# Patient Record
Sex: MALE | Race: WHITE | NOT HISPANIC OR LATINO | Employment: OTHER | URBAN - METROPOLITAN AREA
[De-identification: names, ages, dates, MRNs, and addresses within clinical notes are randomized per-mention and may not be internally consistent; named-entity substitution may affect disease eponyms.]

---

## 2019-04-18 ENCOUNTER — APPOINTMENT (OUTPATIENT)
Dept: RADIOLOGY | Facility: MEDICAL CENTER | Age: 84
End: 2019-04-18
Attending: EMERGENCY MEDICINE
Payer: MEDICARE

## 2019-04-18 ENCOUNTER — HOSPITAL ENCOUNTER (OUTPATIENT)
Facility: MEDICAL CENTER | Age: 84
End: 2019-04-19
Attending: EMERGENCY MEDICINE | Admitting: INTERNAL MEDICINE
Payer: MEDICARE

## 2019-04-18 DIAGNOSIS — R07.9 ACUTE CHEST PAIN: ICD-10-CM

## 2019-04-18 DIAGNOSIS — R06.00 DYSPNEA, UNSPECIFIED TYPE: ICD-10-CM

## 2019-04-18 PROBLEM — E87.6 HYPOKALEMIA: Status: ACTIVE | Noted: 2019-04-18

## 2019-04-18 PROBLEM — N40.0 BPH (BENIGN PROSTATIC HYPERPLASIA): Status: ACTIVE | Noted: 2019-04-18

## 2019-04-18 LAB
ALBUMIN SERPL BCP-MCNC: 4.1 G/DL (ref 3.2–4.9)
ALBUMIN/GLOB SERPL: 1.5 G/DL
ALP SERPL-CCNC: 38 U/L (ref 30–99)
ALT SERPL-CCNC: 19 U/L (ref 2–50)
ANION GAP SERPL CALC-SCNC: 9 MMOL/L (ref 0–11.9)
AST SERPL-CCNC: 23 U/L (ref 12–45)
BASOPHILS # BLD AUTO: 0.3 % (ref 0–1.8)
BASOPHILS # BLD: 0.03 K/UL (ref 0–0.12)
BILIRUB SERPL-MCNC: 0.8 MG/DL (ref 0.1–1.5)
BUN SERPL-MCNC: 20 MG/DL (ref 8–22)
CALCIUM SERPL-MCNC: 8.8 MG/DL (ref 8.4–10.2)
CHLORIDE SERPL-SCNC: 103 MMOL/L (ref 96–112)
CO2 SERPL-SCNC: 25 MMOL/L (ref 20–33)
CREAT SERPL-MCNC: 0.76 MG/DL (ref 0.5–1.4)
D DIMER PPP IA.FEU-MCNC: <0.4 UG/ML (FEU) (ref 0–0.5)
EKG IMPRESSION: NORMAL
EOSINOPHIL # BLD AUTO: 0.14 K/UL (ref 0–0.51)
EOSINOPHIL NFR BLD: 1.3 % (ref 0–6.9)
ERYTHROCYTE [DISTWIDTH] IN BLOOD BY AUTOMATED COUNT: 40.4 FL (ref 35.9–50)
GLOBULIN SER CALC-MCNC: 2.7 G/DL (ref 1.9–3.5)
GLUCOSE BLD-MCNC: 237 MG/DL (ref 65–99)
GLUCOSE SERPL-MCNC: 197 MG/DL (ref 65–99)
HCT VFR BLD AUTO: 44.4 % (ref 42–52)
HGB BLD-MCNC: 15.3 G/DL (ref 14–18)
IMM GRANULOCYTES # BLD AUTO: 0.02 K/UL (ref 0–0.11)
IMM GRANULOCYTES NFR BLD AUTO: 0.2 % (ref 0–0.9)
LYMPHOCYTES # BLD AUTO: 1.47 K/UL (ref 1–4.8)
LYMPHOCYTES NFR BLD: 13.7 % (ref 22–41)
MCH RBC QN AUTO: 29.8 PG (ref 27–33)
MCHC RBC AUTO-ENTMCNC: 34.5 G/DL (ref 33.7–35.3)
MCV RBC AUTO: 86.5 FL (ref 81.4–97.8)
MONOCYTES # BLD AUTO: 1.05 K/UL (ref 0–0.85)
MONOCYTES NFR BLD AUTO: 9.8 % (ref 0–13.4)
NEUTROPHILS # BLD AUTO: 8.02 K/UL (ref 1.82–7.42)
NEUTROPHILS NFR BLD: 74.7 % (ref 44–72)
NRBC # BLD AUTO: 0 K/UL
NRBC BLD-RTO: 0 /100 WBC
PLATELET # BLD AUTO: 170 K/UL (ref 164–446)
PMV BLD AUTO: 11.2 FL (ref 9–12.9)
POTASSIUM SERPL-SCNC: 3 MMOL/L (ref 3.6–5.5)
PROT SERPL-MCNC: 6.8 G/DL (ref 6–8.2)
RBC # BLD AUTO: 5.13 M/UL (ref 4.7–6.1)
SODIUM SERPL-SCNC: 137 MMOL/L (ref 135–145)
TROPONIN I SERPL-MCNC: <0.02 NG/ML (ref 0–0.04)
TROPONIN I SERPL-MCNC: <0.02 NG/ML (ref 0–0.04)
WBC # BLD AUTO: 10.7 K/UL (ref 4.8–10.8)

## 2019-04-18 PROCEDURE — 700102 HCHG RX REV CODE 250 W/ 637 OVERRIDE(OP): Performed by: EMERGENCY MEDICINE

## 2019-04-18 PROCEDURE — 93005 ELECTROCARDIOGRAM TRACING: CPT | Performed by: INTERNAL MEDICINE

## 2019-04-18 PROCEDURE — 99219 PR INITIAL OBSERVATION CARE,LEVL II: CPT | Performed by: INTERNAL MEDICINE

## 2019-04-18 PROCEDURE — A9270 NON-COVERED ITEM OR SERVICE: HCPCS | Performed by: EMERGENCY MEDICINE

## 2019-04-18 PROCEDURE — 80053 COMPREHEN METABOLIC PANEL: CPT

## 2019-04-18 PROCEDURE — 71045 X-RAY EXAM CHEST 1 VIEW: CPT

## 2019-04-18 PROCEDURE — 700111 HCHG RX REV CODE 636 W/ 250 OVERRIDE (IP): Performed by: INTERNAL MEDICINE

## 2019-04-18 PROCEDURE — 82962 GLUCOSE BLOOD TEST: CPT

## 2019-04-18 PROCEDURE — G0378 HOSPITAL OBSERVATION PER HR: HCPCS

## 2019-04-18 PROCEDURE — 93005 ELECTROCARDIOGRAM TRACING: CPT

## 2019-04-18 PROCEDURE — 96372 THER/PROPH/DIAG INJ SC/IM: CPT

## 2019-04-18 PROCEDURE — 85379 FIBRIN DEGRADATION QUANT: CPT

## 2019-04-18 PROCEDURE — 700102 HCHG RX REV CODE 250 W/ 637 OVERRIDE(OP): Performed by: INTERNAL MEDICINE

## 2019-04-18 PROCEDURE — 700102 HCHG RX REV CODE 250 W/ 637 OVERRIDE(OP)

## 2019-04-18 PROCEDURE — 99285 EMERGENCY DEPT VISIT HI MDM: CPT

## 2019-04-18 PROCEDURE — 84484 ASSAY OF TROPONIN QUANT: CPT

## 2019-04-18 PROCEDURE — A9270 NON-COVERED ITEM OR SERVICE: HCPCS | Performed by: INTERNAL MEDICINE

## 2019-04-18 PROCEDURE — 85025 COMPLETE CBC W/AUTO DIFF WBC: CPT

## 2019-04-18 RX ORDER — HYDROCHLOROTHIAZIDE 25 MG/1
25 TABLET ORAL DAILY
Status: DISCONTINUED | OUTPATIENT
Start: 2019-04-19 | End: 2019-04-19 | Stop reason: HOSPADM

## 2019-04-18 RX ORDER — POLYETHYLENE GLYCOL 3350 17 G/17G
1 POWDER, FOR SOLUTION ORAL
Status: DISCONTINUED | OUTPATIENT
Start: 2019-04-18 | End: 2019-04-19 | Stop reason: HOSPADM

## 2019-04-18 RX ORDER — AMLODIPINE BESYLATE 5 MG/1
10 TABLET ORAL
Status: DISCONTINUED | OUTPATIENT
Start: 2019-04-19 | End: 2019-04-19 | Stop reason: HOSPADM

## 2019-04-18 RX ORDER — METOPROLOL SUCCINATE 100 MG/1
100 TABLET, EXTENDED RELEASE ORAL DAILY
Status: DISCONTINUED | OUTPATIENT
Start: 2019-04-19 | End: 2019-04-19 | Stop reason: HOSPADM

## 2019-04-18 RX ORDER — ASPIRIN 600 MG/1
300 SUPPOSITORY RECTAL DAILY
Status: DISCONTINUED | OUTPATIENT
Start: 2019-04-18 | End: 2019-04-19 | Stop reason: HOSPADM

## 2019-04-18 RX ORDER — NITROGLYCERIN 0.4 MG/1
0.4 TABLET SUBLINGUAL
Status: DISCONTINUED | OUTPATIENT
Start: 2019-04-18 | End: 2019-04-19 | Stop reason: HOSPADM

## 2019-04-18 RX ORDER — BENAZEPRIL HYDROCHLORIDE 10 MG/1
40 TABLET ORAL
Status: DISCONTINUED | OUTPATIENT
Start: 2019-04-19 | End: 2019-04-19 | Stop reason: HOSPADM

## 2019-04-18 RX ORDER — ASPIRIN 81 MG/1
324 TABLET, CHEWABLE ORAL DAILY
Status: DISCONTINUED | OUTPATIENT
Start: 2019-04-18 | End: 2019-04-19 | Stop reason: HOSPADM

## 2019-04-18 RX ORDER — DOXAZOSIN 2 MG/1
4 TABLET ORAL DAILY
Status: DISCONTINUED | OUTPATIENT
Start: 2019-04-19 | End: 2019-04-19 | Stop reason: HOSPADM

## 2019-04-18 RX ORDER — SIMVASTATIN 20 MG
20 TABLET ORAL EVERY EVENING
Status: DISCONTINUED | OUTPATIENT
Start: 2019-04-18 | End: 2019-04-19 | Stop reason: HOSPADM

## 2019-04-18 RX ORDER — HYDRALAZINE HYDROCHLORIDE 20 MG/ML
10 INJECTION INTRAMUSCULAR; INTRAVENOUS EVERY 4 HOURS PRN
Status: DISCONTINUED | OUTPATIENT
Start: 2019-04-18 | End: 2019-04-19 | Stop reason: HOSPADM

## 2019-04-18 RX ORDER — ASPIRIN 325 MG
325 TABLET ORAL DAILY
Status: DISCONTINUED | OUTPATIENT
Start: 2019-04-18 | End: 2019-04-19 | Stop reason: HOSPADM

## 2019-04-18 RX ORDER — AMOXICILLIN 250 MG
2 CAPSULE ORAL 2 TIMES DAILY
Status: DISCONTINUED | OUTPATIENT
Start: 2019-04-18 | End: 2019-04-19 | Stop reason: HOSPADM

## 2019-04-18 RX ORDER — BISACODYL 10 MG
10 SUPPOSITORY, RECTAL RECTAL
Status: DISCONTINUED | OUTPATIENT
Start: 2019-04-18 | End: 2019-04-19 | Stop reason: HOSPADM

## 2019-04-18 RX ORDER — NITROGLYCERIN 0.4 MG/1
0.4 TABLET SUBLINGUAL ONCE
Status: COMPLETED | OUTPATIENT
Start: 2019-04-18 | End: 2019-04-18

## 2019-04-18 RX ORDER — ONDANSETRON 2 MG/ML
4 INJECTION INTRAMUSCULAR; INTRAVENOUS EVERY 4 HOURS PRN
Status: DISCONTINUED | OUTPATIENT
Start: 2019-04-18 | End: 2019-04-19 | Stop reason: HOSPADM

## 2019-04-18 RX ORDER — DEXTROSE MONOHYDRATE 25 G/50ML
25 INJECTION, SOLUTION INTRAVENOUS
Status: DISCONTINUED | OUTPATIENT
Start: 2019-04-18 | End: 2019-04-19 | Stop reason: HOSPADM

## 2019-04-18 RX ORDER — AMLODIPINE AND BENAZEPRIL HYDROCHLORIDE 10; 40 MG/1; MG/1
1 CAPSULE ORAL DAILY
Status: DISCONTINUED | OUTPATIENT
Start: 2019-04-18 | End: 2019-04-18

## 2019-04-18 RX ORDER — POTASSIUM CHLORIDE 20 MEQ/1
40 TABLET, EXTENDED RELEASE ORAL 2 TIMES DAILY
Status: COMPLETED | OUTPATIENT
Start: 2019-04-18 | End: 2019-04-19

## 2019-04-18 RX ORDER — ONDANSETRON 4 MG/1
4 TABLET, ORALLY DISINTEGRATING ORAL EVERY 4 HOURS PRN
Status: DISCONTINUED | OUTPATIENT
Start: 2019-04-18 | End: 2019-04-19 | Stop reason: HOSPADM

## 2019-04-18 RX ADMIN — ENOXAPARIN SODIUM 40 MG: 100 INJECTION SUBCUTANEOUS at 17:12

## 2019-04-18 RX ADMIN — SIMVASTATIN 20 MG: 20 TABLET, FILM COATED ORAL at 17:12

## 2019-04-18 RX ADMIN — INSULIN HUMAN 3 UNITS: 100 INJECTION, SOLUTION PARENTERAL at 21:08

## 2019-04-18 RX ADMIN — NITROGLYCERIN 0.4 MG: 0.4 TABLET, ORALLY DISINTEGRATING SUBLINGUAL at 13:18

## 2019-04-18 RX ADMIN — POTASSIUM CHLORIDE 40 MEQ: 1500 TABLET, EXTENDED RELEASE ORAL at 23:42

## 2019-04-18 ASSESSMENT — ENCOUNTER SYMPTOMS
DIZZINESS: 0
FEVER: 0
ABDOMINAL PAIN: 0
CHILLS: 0
SHORTNESS OF BREATH: 1
COUGH: 0
SPUTUM PRODUCTION: 0
NAUSEA: 0
TINGLING: 0
HEADACHES: 1
PALPITATIONS: 0
WEAKNESS: 0
VOMITING: 0
STRIDOR: 0
MYALGIAS: 0
FALLS: 0
LOSS OF CONSCIOUSNESS: 0
DEPRESSION: 0
DIARRHEA: 0
CONSTIPATION: 0

## 2019-04-18 ASSESSMENT — COGNITIVE AND FUNCTIONAL STATUS - GENERAL
DAILY ACTIVITIY SCORE: 24
SUGGESTED CMS G CODE MODIFIER DAILY ACTIVITY: CH
MOBILITY SCORE: 24
SUGGESTED CMS G CODE MODIFIER MOBILITY: CH

## 2019-04-18 ASSESSMENT — LIFESTYLE VARIABLES
HAVE PEOPLE ANNOYED YOU BY CRITICIZING YOUR DRINKING: NO
TOTAL SCORE: 0
CONSUMPTION TOTAL: NEGATIVE
HAVE YOU EVER FELT YOU SHOULD CUT DOWN ON YOUR DRINKING: NO
TOTAL SCORE: 0
ON A TYPICAL DAY WHEN YOU DRINK ALCOHOL HOW MANY DRINKS DO YOU HAVE: 1
TOTAL SCORE: 0
EVER_SMOKED: NEVER
HOW MANY TIMES IN THE PAST YEAR HAVE YOU HAD 5 OR MORE DRINKS IN A DAY: 0
AVERAGE NUMBER OF DAYS PER WEEK YOU HAVE A DRINK CONTAINING ALCOHOL: 2
ALCOHOL_USE: YES
EVER HAD A DRINK FIRST THING IN THE MORNING TO STEADY YOUR NERVES TO GET RID OF A HANGOVER: NO
EVER FELT BAD OR GUILTY ABOUT YOUR DRINKING: NO

## 2019-04-18 ASSESSMENT — PATIENT HEALTH QUESTIONNAIRE - PHQ9
1. LITTLE INTEREST OR PLEASURE IN DOING THINGS: NOT AT ALL
2. FEELING DOWN, DEPRESSED, IRRITABLE, OR HOPELESS: NOT AT ALL
SUM OF ALL RESPONSES TO PHQ9 QUESTIONS 1 AND 2: 0

## 2019-04-18 NOTE — ED NOTES
Blood draw by lab-pt roomed from sot[facility] bed. In no apparent distress, states midsternal chest pain 7-8/10. Skin warm/dry, chest pain  increased with deep breath. sr on moniter, followed by dr cruz cardiology. Med per erp

## 2019-04-18 NOTE — ED PROVIDER NOTES
ED Provider Note    CHIEF COMPLAINT  Chief Complaint   Patient presents with   • Chest Pain     chest pain started at 0700 this am pain radiates to left shoulder and left neck       HPI  Jalen Huddleston is a 85 y.o. male who presents to the emergency department the chief complaint of chest pain.  The patient went down to the mailbox this morning approximately 8 in the morning.  He walked downhill to the mailbox and felt fine.  When he was walking back up hill toward the house he noticed that he got more short of breath and then developed chest pain.  He says the chest pain is in his upper chest may be a little bit more to the left side.  It radiated to his left side of his neck any had a bit of a left-sided headache.  He felt very short of breath.  More short of breath than typical when going to get his mail which he does not a regular basis.  He denies any hemoptysis.  No leg pain or swelling.  The patient took 2 full-strength aspirin prior to arrival.    REVIEW OF SYSTEMS  See HPI for further details. All other systems are negative.     PAST MEDICAL HISTORY  Past Medical History:   Diagnosis Date   • Diabetes 9/20/2011   • Hyperlipidemia 9/20/2011   • Hypertension 9/20/2011       FAMILY HISTORY  Family History   Problem Relation Age of Onset   • Other Neg Hx        SOCIAL HISTORY  Social History     Social History   • Marital status:      Spouse name: N/A   • Number of children: N/A   • Years of education: N/A     Social History Main Topics   • Smoking status: Never Smoker   • Smokeless tobacco: Not on file   • Alcohol use 3.5 oz/week     7 Glasses of wine per week      Comment: 0cc   • Drug use: No   • Sexual activity: Not on file     Other Topics Concern   • Not on file     Social History Narrative   • No narrative on file       SURGICAL HISTORY  History reviewed. No pertinent surgical history.    CURRENT MEDICATIONS  Home Medications     Reviewed by Adamaris Mejía R.N. (Registered Nurse) on 04/18/19 at  1322  Med List Status: Partial   Medication Last Dose Status   amlodipine-benazepril (LOTREL) 10-40 MG per capsule 4/18/2019 Active   aspirin EC (ECOTRIN) 81 MG TBEC 4/18/2019 Active   doxazosin (CARDURA) 4 MG TABS 4/18/2019 Active   hydrochlorothiazide (HYDRODIURIL) 25 MG TABS 4/18/2019 Active   metoprolol SR (TOPROL XL) 100 MG TB24 4/18/2019 Active   simvastatin (ZOCOR) 20 MG TABS 4/17/2019 Active                ALLERGIES  No Known Allergies    PHYSICAL EXAM  VITAL SIGNS: /82   Pulse 76   Temp 36.7 °C (98 °F) (Temporal)   Resp 18   Ht 1.829 m (6')   Wt 92.8 kg (204 lb 9.4 oz)   SpO2 93%   BMI 27.75 kg/m²   Constitutional: Well developed, Well nourished, No acute distress, Non-toxic appearance.   HENT: Normocephalic, Atraumatic, Bilateral external ears normal, Oropharynx moist, No oral exudates, Nose normal.   Eyes: PERRLA, EOMI, Conjunctiva normal, No discharge.   Neck: Normal range of motion, No tenderness, Supple, No stridor.   Cardiovascular: Regular rate and rhythm, no audible murmur  Thorax & Lungs: Clear to auscultation bilaterally.  No rales, rhonchi, or wheezing.  Abdomen: Bowel sounds normal, Soft, No tenderness, No masses, No pulsatile masses.   Skin: Warm, Dry, No erythema, No rash.   Back: No tenderness, No CVA tenderness.   Extremities: Intact distal pulses, No tenderness, No cyanosis, No clubbing.  No edema.  No calf tenderness.  Neurologic: Alert & oriented x 3, Normal motor function, Normal sensory function, No focal deficits noted.     EKG  See below    RADIOLOGY/PROCEDURES  DX-CHEST-PORTABLE (1 VIEW)   Final Result      No evidence of acute cardiopulmonary process.        Results for orders placed or performed during the hospital encounter of 04/18/19   CBC with Differential   Result Value Ref Range    WBC 10.7 4.8 - 10.8 K/uL    RBC 5.13 4.70 - 6.10 M/uL    Hemoglobin 15.3 14.0 - 18.0 g/dL    Hematocrit 44.4 42.0 - 52.0 %    MCV 86.5 81.4 - 97.8 fL    MCH 29.8 27.0 - 33.0 pg    MCHC  34.5 33.7 - 35.3 g/dL    RDW 40.4 35.9 - 50.0 fL    Platelet Count 170 164 - 446 K/uL    MPV 11.2 9.0 - 12.9 fL    Neutrophils-Polys 74.70 (H) 44.00 - 72.00 %    Lymphocytes 13.70 (L) 22.00 - 41.00 %    Monocytes 9.80 0.00 - 13.40 %    Eosinophils 1.30 0.00 - 6.90 %    Basophils 0.30 0.00 - 1.80 %    Immature Granulocytes 0.20 0.00 - 0.90 %    Nucleated RBC 0.00 /100 WBC    Neutrophils (Absolute) 8.02 (H) 1.82 - 7.42 K/uL    Lymphs (Absolute) 1.47 1.00 - 4.80 K/uL    Monos (Absolute) 1.05 (H) 0.00 - 0.85 K/uL    Eos (Absolute) 0.14 0.00 - 0.51 K/uL    Baso (Absolute) 0.03 0.00 - 0.12 K/uL    Immature Granulocytes (abs) 0.02 0.00 - 0.11 K/uL    NRBC (Absolute) 0.00 K/uL   Complete Metabolic Panel (CMP)   Result Value Ref Range    Sodium 137 135 - 145 mmol/L    Potassium 3.0 (L) 3.6 - 5.5 mmol/L    Chloride 103 96 - 112 mmol/L    Co2 25 20 - 33 mmol/L    Anion Gap 9.0 0.0 - 11.9    Glucose 197 (H) 65 - 99 mg/dL    Bun 20 8 - 22 mg/dL    Creatinine 0.76 0.50 - 1.40 mg/dL    Calcium 8.8 8.4 - 10.2 mg/dL    AST(SGOT) 23 12 - 45 U/L    ALT(SGPT) 19 2 - 50 U/L    Alkaline Phosphatase 38 30 - 99 U/L    Total Bilirubin 0.8 0.1 - 1.5 mg/dL    Albumin 4.1 3.2 - 4.9 g/dL    Total Protein 6.8 6.0 - 8.2 g/dL    Globulin 2.7 1.9 - 3.5 g/dL    A-G Ratio 1.5 g/dL   Troponin   Result Value Ref Range    Troponin I <0.02 0.00 - 0.04 ng/mL   ESTIMATED GFR   Result Value Ref Range    GFR If African American >60 >60 mL/min/1.73 m 2    GFR If Non African American >60 >60 mL/min/1.73 m 2   EKG   Result Value Ref Range    Report       [facility] Emergency Dept.    Test Date:  2019  Pt Name:    KD ALEJO             Department: [facility]  MRN:        [id number]                      Room:  Gender:     Male                         Technician: [id number]  :        1934                   Requested By:ER TRIAGE PROTOCOL  Order #:    [id number]                    Debora MD: CHARLENE NESS,  MD    Measurements  Intervals                                Axis  Rate:       79                           P:          84  NE:         148                          QRS:        40  QRSD:       151                          T:          10  QT:         394  QTc:        452    Interpretive Statements  Sinus rhythm  Right bundle branch block  No previous ECG available for comparison    Electronically Signed On 4- 12:51:42 PDT by KENNY NESS MD           COURSE & MEDICAL DECISION MAKING  Pertinent Labs & Imaging studies reviewed. (See chart for details)    The patient presents today with chest pain or shortness of breath that started with exertion this morning.  His description of pain is somewhat concerning for cardiac related pain.  Patient already took 2 aspirin prior to arrival.    In the emergency department the patient received an EKG which is remarkable for right bundle branch but no diagnostic findings consistent with ischemia.  Laboratory studies remarkable for a normal troponin.  Chest x-ray is unrevealing.    The patient received nitro in the emergency department with no significant change in his chest discomfort.    Patient will be admitted to the hospital for further evaluation and treatment.  I spoke with the on-call hospitalist Dr. Costa for admission.      FINAL IMPRESSION  1. Acute chest pain    2. Dyspnea, unspecified type              Electronically signed by: Kenny Ness, 4/18/2019 2:03 PM

## 2019-04-19 ENCOUNTER — PATIENT OUTREACH (OUTPATIENT)
Dept: HEALTH INFORMATION MANAGEMENT | Facility: OTHER | Age: 84
End: 2019-04-19

## 2019-04-19 ENCOUNTER — APPOINTMENT (OUTPATIENT)
Dept: RADIOLOGY | Facility: MEDICAL CENTER | Age: 84
End: 2019-04-19
Attending: INTERNAL MEDICINE
Payer: MEDICARE

## 2019-04-19 VITALS
DIASTOLIC BLOOD PRESSURE: 76 MMHG | OXYGEN SATURATION: 90 % | TEMPERATURE: 98.1 F | HEIGHT: 71 IN | SYSTOLIC BLOOD PRESSURE: 138 MMHG | WEIGHT: 200.62 LBS | BODY MASS INDEX: 28.09 KG/M2 | HEART RATE: 78 BPM | RESPIRATION RATE: 20 BRPM

## 2019-04-19 LAB
EKG IMPRESSION: NORMAL
GLUCOSE BLD-MCNC: 164 MG/DL (ref 65–99)
GLUCOSE BLD-MCNC: 175 MG/DL (ref 65–99)

## 2019-04-19 PROCEDURE — 700111 HCHG RX REV CODE 636 W/ 250 OVERRIDE (IP)

## 2019-04-19 PROCEDURE — 82962 GLUCOSE BLOOD TEST: CPT

## 2019-04-19 PROCEDURE — 99217 PR OBSERVATION CARE DISCHARGE: CPT | Performed by: HOSPITALIST

## 2019-04-19 PROCEDURE — G0378 HOSPITAL OBSERVATION PER HR: HCPCS

## 2019-04-19 PROCEDURE — A9502 TC99M TETROFOSMIN: HCPCS

## 2019-04-19 PROCEDURE — 96372 THER/PROPH/DIAG INJ SC/IM: CPT | Mod: XU

## 2019-04-19 PROCEDURE — 93018 CV STRESS TEST I&R ONLY: CPT | Performed by: INTERNAL MEDICINE

## 2019-04-19 PROCEDURE — 700102 HCHG RX REV CODE 250 W/ 637 OVERRIDE(OP): Performed by: INTERNAL MEDICINE

## 2019-04-19 PROCEDURE — 78452 HT MUSCLE IMAGE SPECT MULT: CPT | Mod: 26 | Performed by: INTERNAL MEDICINE

## 2019-04-19 PROCEDURE — A9270 NON-COVERED ITEM OR SERVICE: HCPCS | Performed by: INTERNAL MEDICINE

## 2019-04-19 PROCEDURE — 93010 ELECTROCARDIOGRAM REPORT: CPT | Performed by: INTERNAL MEDICINE

## 2019-04-19 RX ORDER — REGADENOSON 0.08 MG/ML
INJECTION, SOLUTION INTRAVENOUS
Status: COMPLETED
Start: 2019-04-19 | End: 2019-04-19

## 2019-04-19 RX ADMIN — HYDROCHLOROTHIAZIDE 25 MG: 25 TABLET ORAL at 06:29

## 2019-04-19 RX ADMIN — BENAZEPRIL HYDROCHLORIDE 40 MG: 10 TABLET ORAL at 06:29

## 2019-04-19 RX ADMIN — DOXAZOSIN 4 MG: 2 TABLET ORAL at 06:29

## 2019-04-19 RX ADMIN — INSULIN HUMAN 2 UNITS: 100 INJECTION, SOLUTION PARENTERAL at 11:23

## 2019-04-19 RX ADMIN — AMLODIPINE BESYLATE 10 MG: 5 TABLET ORAL at 06:29

## 2019-04-19 RX ADMIN — ASPIRIN 325 MG ORAL TABLET 325 MG: 325 PILL ORAL at 06:29

## 2019-04-19 RX ADMIN — POTASSIUM CHLORIDE 40 MEQ: 1500 TABLET, EXTENDED RELEASE ORAL at 06:29

## 2019-04-19 RX ADMIN — REGADENOSON 0.4 MG: 0.08 INJECTION, SOLUTION INTRAVENOUS at 09:01

## 2019-04-19 NOTE — PROGRESS NOTES
Report received from Bro about 1535 and pt transferred to the floor.  Pt ambulated from the gurney to the bed independently.  Pt complains of pressure in his chest, but reports its better than before.    Admit profile completed and pt reports he takes metformin bid; notified Dr. Moyer at the nursing station of this drug and he would look at his meds later when he had the opportunity.  POC discussed including npo at midnight for stress test, troponins, and ekgs.    EKG completed after admit profile and filed in his chart.  VSS.

## 2019-04-19 NOTE — PROGRESS NOTES
Resting in bed. Alert and oriented. No c/o pain at this time, states some mild chest pressure and discomfort. , covered per sliding scale. No other c/o. Call light in reach.

## 2019-04-19 NOTE — DISCHARGE INSTRUCTIONS
Discharge Instructions    Discharged to home by car with relative. Discharged via walking, hospital escort: Refused.  Special equipment needed: Not Applicable    Be sure to schedule a follow-up appointment with your primary care doctor or any specialists as instructed.     Discharge Plan:   Pneumococcal Vaccine Administered/Refused: Not given - Patient refused pneumococcal vaccine  Influenza Vaccine Indication: Not indicated: Previously immunized this influenza season and > 8 years of age    I understand that a diet low in cholesterol, fat, and sodium is recommended for good health. Unless I have been given specific instructions below for another diet, I accept this instruction as my diet prescription.   Other diet: cardiac, ada    Special Instructions: None    · Is patient discharged on Warfarin / Coumadin?   No     Depression / Suicide Risk    As you are discharged from this [facility] facility, it is important to learn how to keep safe from harming yourself.    Recognize the warning signs:  · Abrupt changes in personality, positive or negative- including increase in energy   · Giving away possessions  · Change in eating patterns- significant weight changes-  positive or negative  · Change in sleeping patterns- unable to sleep or sleeping all the time   · Unwillingness or inability to communicate  · Depression  · Unusual sadness, discouragement and loneliness  · Talk of wanting to die  · Neglect of personal appearance   · Rebelliousness- reckless behavior  · Withdrawal from people/activities they love  · Confusion- inability to concentrate     If you or a loved one observes any of these behaviors or has concerns about self-harm, here's what you can do:  · Talk about it- your feelings and reasons for harming yourself  · Remove any means that you might use to hurt yourself (examples: pills, rope, extension cords, firearm)  · Get professional help from the community (Mental Health, Substance Abuse, psychological  counseling)  · Do not be alone:Call your Safe Contact- someone whom you trust who will be there for you.  · Call your local CRISIS HOTLINE 975-7765 or 210-538-4474  · Call your local Children's Mobile Crisis Response Team [place] (514) 770-1766 or www.textmetix  · Call the toll free National Suicide Prevention Hotlines   · National Suicide Prevention Lifeline 635-624-UNEU (9014)  · National Hope Line Network 800-SUICIDE (811-3495)    Chest Pain, Nonspecific  It is often hard to give a specific diagnosis for the cause of chest pain. There is always a chance that your pain could be related to something serious, like a heart attack or a blood clot in the lungs. You need to follow up with your caregiver for further evaluation. More lab tests or other studies such as X-rays, electrocardiography, stress testing, or cardiac imaging may be needed to find the cause of your pain.  Most of the time, nonspecific chest pain improves within 2 to 3 days with rest and mild pain medicine. For the next few days, avoid physical exertion or activities that bring on pain. Do not smoke. Avoid drinking alcohol. Call your caregiver for routine follow-up as advised.   SEEK IMMEDIATE MEDICAL CARE IF:  · You develop increased chest pain or pain that radiates to the arm, neck, jaw, back, or abdomen.   · You develop shortness of breath, increased coughing, or you start coughing up blood.   · You have severe back or abdominal pain, nausea, or vomiting.   · You develop severe weakness, fainting, fever, or chills.   Document Released: 12/18/2006 Document Revised: 03/11/2013 Document Reviewed: 06/06/2008  ExitCare® Patient Information ©2013 [employer].      Exercise Stress Echocardiogram  An exercise stress echocardiogram is a test that checks how well your heart is working. For this test, you will walk on a treadmill to make your heart beat faster. This test uses sound waves (ultrasound) and a computer to make pictures (images) of  your heart. These pictures will be taken before you exercise and after you exercise.  What happens before the procedure?  · Follow instructions from your doctor about what you cannot eat or drink before the test.  · Do not drink or eat anything that has caffeine in it. Stop having caffeine for 24 hours before the test.  · Ask your doctor about changing or stopping your normal medicines. This is important if you take diabetes medicines or blood thinners. Ask your doctor if you should take your medicines with water before the test.  · If you use an inhaler, bring it to the test.  · Do not use any products that have nicotine or tobacco in them, such as cigarettes and e-cigarettes. Stop using them for 4 hours before the test. If you need help quitting, ask your doctor.  · Wear comfortable shoes and clothing.  What happens during the procedure?  · You will be hooked up to a TV screen. Your doctor will watch the screen to see how fast your heart beats during the test.  · Before you exercise, a computer will make a picture of your heart. To do this:  ¨ A gel will be put on your chest.  ¨ A wand will be moved over the gel.  ¨ Sound waves from the wand will go to the computer to make the picture.  · Your will start walking on a treadmill. The treadmill will start at a slow speed. It will get faster a little bit at a time. When you walk faster, your heart will beat faster.  · The treadmill will be stopped when your heart is working hard.  · You will lie down right away so another picture of your heart can be taken.  · The test will take 30-60 minutes.  What happens after the procedure?  · Your heart rate and blood pressure will be watched after the test.  · If your doctor says that you can, you may:  ¨ Eat what you usually eat.  ¨ Do your normal activities.  ¨ Take medicines like normal.  Summary  · An exercise stress echocardiogram is a test that checks how well your heart is working.  · Follow instructions about what you  cannot eat or drink before the test. Ask your doctor if you should take your normal medicines before the test.  · Stop having caffeine for 24 hours before the test. Do not use anything with nicotine or tobacco in it for 4 hours before the test.  · A computer will take a picture of your heart before you walk on a treadmill. It will take another picture when you are done walking.  · Your heart rate and blood pressure will be watched after the test.  This information is not intended to replace advice given to you by your health care provider. Make sure you discuss any questions you have with your health care provider.  Document Released: 10/15/2010 Document Revised: 09/10/2017 Document Reviewed: 09/10/2017  Elsevier Interactive Patient Education © 2017 [employer]

## 2019-04-19 NOTE — PROGRESS NOTES
Resting with eyes closed. Respirations even and unlabored. Easily aroused to sound or touch. Denies chest pain at this time. Due medications given per MAR. Call light in reach.

## 2019-04-19 NOTE — PROGRESS NOTES
Nursing care plan includes knowledge deficit, potential for discomfort, potential for compromised cardiac output. POC includes teaching, comfort measures and reassurance, and access to code cart, cardiology stand by and availability of rapid response team. Pt verbalizes good understanding of benefits and risks of pharmacological cardiac stress test. Informed consent obtained. Lexiscan given, pt denied cardiac symptoms or side effects. Baseline EKG presents with RBBB. No EKG changes or abnormalities with exam. VS stable. To waiting room, caffeinated fluids and/or snacks given. Nursing goals met.

## 2019-04-19 NOTE — PROGRESS NOTES
Patient given discharge information, verbalized understanding. IV and tele discontinued. Patient ambulated to front [facility].

## 2019-04-19 NOTE — DISCHARGE SUMMARY
Discharge Summary    CHIEF COMPLAINT ON ADMISSION  Chief Complaint   Patient presents with   • Chest Pain     chest pain started at 0700 this am pain radiates to left shoulder and left neck       Reason for Admission  Chest Pain; Shortness Of Pain     Admission Date  4/18/2019    CODE STATUS  Full Code    HPI & HOSPITAL COURSE  85 y.o. male who presented 4/18/2019 with chest pain.  Patient states this morning he went to get his mail, on the return trip its uphill, he began breathing hard, he normally tolerates this he will better.  He then began having left-sided chest pain which radiated to his left neck and left shoulder.  He states the pain was 5/10, initially located on the left and then it became substernal, worse with a deep breath.  He states it was sharp in nature.  He went to bed in his usual state of health which is to say he had his typical body aches but nothing profoundly abnormal.  He states his chest pain did improve with nitroglycerin.    He has had no further chest pain since admission.  He underwent myocardial perfusion imaging study which was negative for any evident of ischemia.  He is already established with a cardiologist at [place], Dr. Andriy Cervantes.  Patient will follow up with him for further recommendations.  He will continue taking an aspirin daily.       Therefore, he is discharged in good and stable condition to home with close outpatient follow-up.        Discharge Date  4/19/2019    FOLLOW UP ITEMS POST DISCHARGE  Primary care and cardiology    DISCHARGE DIAGNOSES  Active Problems:    Chest pain POA: Unknown    Hypertension POA: Yes    Hyperlipidemia POA: Yes    Diabetes mellitus with hyperglycemia (HCC) POA: Yes    BPH (benign prostatic hyperplasia) POA: Unknown    Hypokalemia POA: Unknown  Resolved Problems:    * No resolved hospital problems. *      FOLLOW UP  No future appointments.  Ernesto Barraza M.D.  [address]  [place] [address]  [phone]            MEDICATIONS ON  DISCHARGE     Medication List      CONTINUE taking these medications      Instructions   amlodipine-benazepril 10-40 MG per capsule  Commonly known as:  LOTREL   Take 1 Cap by mouth every day.  Dose:  1 Cap     aspirin EC 81 MG Tbec  Commonly known as:  ECOTRIN   Take 81 mg by mouth every day.  Dose:  81 mg     doxazosin 4 MG Tabs  Commonly known as:  CARDURA   Take 1 Tab by mouth every day.  Dose:  4 mg     hydroCHLOROthiazide 25 MG Tabs  Commonly known as:  HYDRODIURIL   Take 1 Tab by mouth every day.  Dose:  25 mg     metFORMIN 500 MG Tabs  Commonly known as:  GLUCOPHAGE   Take 500 mg by mouth 2 times a day, with meals. With breakfast and with lunch  Dose:  500 mg     metoprolol  MG Tb24  Commonly known as:  TOPROL XL   Take 1 Tab by mouth every day.  Dose:  100 mg     simvastatin 20 MG Tabs  Commonly known as:  ZOCOR   Take 1 Tab by mouth every evening.  Dose:  20 mg            Allergies  No Known Allergies    DIET  Orders Placed This Encounter   Procedures   • Diet Order Diabetic     Standing Status:   Standing     Number of Occurrences:   1     Order Specific Question:   Diet:     Answer:   Diabetic [3]     Order Specific Question:   Miscellaneous modifications:     Answer:   No Decaf, No Caffeine(for test) [11]     Comments:   Protocol 1313 Patient to have no caffeine for 12 hours prior to exam (decaf, coffee, cola, tea, chocolate)   • Diet NPO     Standing Status:   Standing     Number of Occurrences:   8     Order Specific Question:   Restrict to:     Answer:   Sips with Medications [3]     Comments:   for stress test        ACTIVITY  As tolerated.  Weight bearing as tolerated    CONSULTATIONS  None    PROCEDURES  None    LABORATORY  Lab Results   Component Value Date    SODIUM 137 04/18/2019    POTASSIUM 3.0 (L) 04/18/2019    CHLORIDE 103 04/18/2019    CO2 25 04/18/2019    GLUCOSE 197 (H) 04/18/2019    BUN 20 04/18/2019    CREATININE 0.76 04/18/2019        Lab Results   Component Value Date    WBC  10.7 04/18/2019    HEMOGLOBIN 15.3 04/18/2019    HEMATOCRIT 44.4 04/18/2019    PLATELETCT 170 04/18/2019

## 2019-04-19 NOTE — ASSESSMENT & PLAN NOTE
-Concerning symptoms considering his pain started with exertion and resolved with nitroglycerin  -Troponins thus far negative, no significant ST changes on EKG  -Monitor patient on telemetry, trend troponin and repeat EKG  -If no concerning changes, obtain stress test

## 2019-04-19 NOTE — PROGRESS NOTES
Due medication given without issue. No c/o pain or pressure at this time. Aware NPO for testing. Call light in reach.

## 2019-04-19 NOTE — ASSESSMENT & PLAN NOTE
-Continue home metoprolol, hydrochlorothiazide, benazepril and amlodipine  -Place PRN hydralazine and adjust as needed

## 2019-04-19 NOTE — H&P
Hospital Medicine History & Physical Note    Date of Service  4/18/2019    Primary Care Physician  Ernesto Barraza M.D.    Consultants  None    Code Status  Full    Chief Complaint  Chest pain    History of Presenting Illness  85 y.o. male who presented 4/18/2019 with chest pain.  Patient states this morning he went to get his mail, on the return trip its uphill, he began breathing hard, he normally tolerates this he will better.  He then began having left-sided chest pain which radiated to his left neck and left shoulder.  He states the pain was 5/10, initially located on the left and then it became substernal, worse with a deep breath.  He states it was sharp in nature.  He went to bed in his usual state of health which is to say he had his typical body aches but nothing profoundly abnormal.  He states his chest pain did improve with nitroglycerin.    Review of Systems  Review of Systems   Constitutional: Negative for chills, fever and malaise/fatigue.   HENT: Negative for congestion.    Respiratory: Positive for shortness of breath. Negative for cough, sputum production and stridor.    Cardiovascular: Positive for chest pain. Negative for palpitations and leg swelling.   Gastrointestinal: Negative for abdominal pain, constipation, diarrhea, nausea and vomiting.   Genitourinary: Negative for dysuria and urgency.   Musculoskeletal: Negative for falls and myalgias.   Neurological: Positive for headaches. Negative for dizziness, tingling, loss of consciousness and weakness.   Psychiatric/Behavioral: Negative for depression and suicidal ideas.   All other systems reviewed and are negative.      Past Medical History   has a past medical history of Diabetes (9/20/2011); Hyperlipidemia (9/20/2011); and Hypertension (9/20/2011).    Surgical History  None    Family History  Reviewed, noncontributory    Social History   reports that he has never smoked. He does not have any smokeless tobacco history on file. He reports  that he drinks about 3.5 oz of alcohol per week . He reports that he does not use drugs.    Allergies  No Known Allergies    Medications  Prior to Admission Medications   Prescriptions Last Dose Informant Patient Reported? Taking?   amlodipine-benazepril (LOTREL) 10-40 MG per capsule 4/18/2019 at am  No No   Sig: Take 1 Cap by mouth every day.   aspirin EC (ECOTRIN) 81 MG TBEC 4/18/2019 at am  Yes No   Sig: Take 81 mg by mouth every day.   doxazosin (CARDURA) 4 MG TABS 4/18/2019 at am  No No   Sig: Take 1 Tab by mouth every day.   hydrochlorothiazide (HYDRODIURIL) 25 MG TABS 4/18/2019 at am  No No   Sig: Take 1 Tab by mouth every day.   metFORMIN (GLUCOPHAGE) 500 MG Tab 4/18/2019 at am  Yes Yes   Sig: Take 500 mg by mouth 2 times a day, with meals. With breakfast and with lunch   metoprolol SR (TOPROL XL) 100 MG TB24 4/18/2019 at am  No No   Sig: Take 1 Tab by mouth every day.   simvastatin (ZOCOR) 20 MG TABS 4/17/2019 at Unknown time  No No   Sig: Take 1 Tab by mouth every evening.      Facility-Administered Medications: None       Physical Exam  Temp:  [36.7 °C (98 °F)-37.3 °C (99.1 °F)] 37.3 °C (99.1 °F)  Pulse:  [70-98] 98  Resp:  [18] 18  BP: (149-150)/(71-82) 150/71  SpO2:  [91 %-93 %] 92 %    Physical Exam   Constitutional: He is oriented to person, place, and time. He appears well-developed and well-nourished.  Non-toxic appearance. No distress.   HENT:   Head: Normocephalic and atraumatic. Not macrocephalic and not microcephalic. Head is without raccoon's eyes and without Lewis's sign.   Right Ear: External ear normal.   Left Ear: External ear normal.   Mouth/Throat: Oropharynx is clear and moist. No oropharyngeal exudate.   Eyes: Conjunctivae are normal. Right eye exhibits no discharge. Left eye exhibits no discharge. No scleral icterus.   Neck: Normal range of motion. Neck supple. No tracheal deviation, no edema and no erythema present.   Cardiovascular: Normal rate, regular rhythm and intact distal  pulses.  Exam reveals no gallop, no friction rub and no decreased pulses.    Murmur heard.  Pulmonary/Chest: Effort normal and breath sounds normal. No stridor. No respiratory distress. He has no decreased breath sounds. He has no wheezes. He has no rhonchi. He has no rales. He exhibits no tenderness.   Abdominal: Soft. Bowel sounds are normal. He exhibits no distension. There is no splenomegaly or hepatomegaly. There is no tenderness. There is no rebound and no guarding.   Musculoskeletal: Normal range of motion. He exhibits no edema, tenderness or deformity.   Lymphadenopathy:     He has no cervical adenopathy.   Neurological: He is alert and oriented to person, place, and time. No cranial nerve deficit. Coordination normal.   Skin: Skin is warm and dry. No rash noted. He is not diaphoretic. No cyanosis or erythema. No pallor. Nails show no clubbing.   Psychiatric: He has a normal mood and affect. His speech is normal and behavior is normal. Judgment and thought content normal. Cognition and memory are normal.   Nursing note and vitals reviewed.      Laboratory:  Recent Labs      04/18/19   1302   WBC  10.7   RBC  5.13   HEMOGLOBIN  15.3   HEMATOCRIT  44.4   MCV  86.5   MCH  29.8   MCHC  34.5   RDW  40.4   PLATELETCT  170   MPV  11.2     Recent Labs      04/18/19   1302   SODIUM  137   POTASSIUM  3.0*   CHLORIDE  103   CO2  25   GLUCOSE  197*   BUN  20   CREATININE  0.76   CALCIUM  8.8     Recent Labs      04/18/19   1302   ALTSGPT  19   ASTSGOT  23   ALKPHOSPHAT  38   TBILIRUBIN  0.8   GLUCOSE  197*                 Recent Labs      04/18/19   1302  04/18/19   1833   TROPONINI  <0.02  <0.02       Urinalysis:    No results found     Imaging:  DX-CHEST-PORTABLE (1 VIEW)   Final Result      No evidence of acute cardiopulmonary process.      NM-CARDIAC STRESS TEST    (Results Pending)         Assessment/Plan:  I anticipate this patient is appropriate for observation status at this time.    Chest pain   Assessment &  Plan    -Concerning symptoms considering his pain started with exertion and resolved with nitroglycerin  -Troponins thus far negative, no significant ST changes on EKG  -Monitor patient on telemetry, trend troponin and repeat EKG  -If no concerning changes, obtain stress test     Hypokalemia   Assessment & Plan    -Start oral potassium     BPH (benign prostatic hyperplasia)   Assessment & Plan    -Continue doxazosin     Diabetes mellitus with hyperglycemia (HCC)- (present on admission)   Assessment & Plan    -Hold home metformin  -Start insulin sliding scale  -Adjust as needed     Hyperlipidemia- (present on admission)   Assessment & Plan    -Continue home statin     Hypertension- (present on admission)   Assessment & Plan    -Continue home metoprolol, hydrochlorothiazide, benazepril and amlodipine  -Place PRN hydralazine and adjust as needed         VTE prophylaxis: Lovenox

## 2019-04-19 NOTE — PROGRESS NOTES
Tele strip at 1640 shows SR in 89.      Measurements from am strip were as follows:  NM=0.18  QRS=0.16 Right BBB  QT=0.38    Tele Shift Summary:    Rhythm : SR to ST  Rate : 70-100s  Ectopy : Per CCT Anabel, pt had occasional PVCs and PACs.     Telemetry monitoring strips placed in pt's chart.

## 2019-04-19 NOTE — PROGRESS NOTES
Report received. Care assumed. Resting in bed. Alert and oriented. Respirations even and unlabored. Reports ability to take deeper breath without pain. States chest pain in bilateral clavicle area without radiation, 2/10. Call light in reach.

## 2019-04-19 NOTE — PROGRESS NOTES
Report received from Mackenzie TRAN. Patient, no complaints. NPO for stress test today. Insulin held this am for NPO status. Bed in low locked position, call within reach. Continue to monitor.

## 2019-04-20 NOTE — PROGRESS NOTES
Telemetry Strip     Strip printed: [id number]  Measurements from am strip were as follows:  Rhythm:sr with bbb  HR: 74  Measurements: .16/.14/.40       Telemetry Shift Summary:    Rhythm: sr with bbb  HR: 60-70s  Ectopy:rpvc           Normal Values  Rhythm SR  HR Range    Measurements 0.12-0.20 / 0.06-0.10  / 0.30-0.52

## 2021-01-14 DIAGNOSIS — Z23 NEED FOR VACCINATION: ICD-10-CM

## 2021-12-09 ENCOUNTER — HOSPITAL ENCOUNTER (OUTPATIENT)
Facility: MEDICAL CENTER | Age: 86
End: 2021-12-10
Attending: EMERGENCY MEDICINE | Admitting: STUDENT IN AN ORGANIZED HEALTH CARE EDUCATION/TRAINING PROGRAM
Payer: MEDICARE

## 2021-12-09 ENCOUNTER — APPOINTMENT (OUTPATIENT)
Dept: RADIOLOGY | Facility: MEDICAL CENTER | Age: 86
End: 2021-12-09
Attending: EMERGENCY MEDICINE
Payer: MEDICARE

## 2021-12-09 DIAGNOSIS — R94.31 ABNORMAL EKG: ICD-10-CM

## 2021-12-09 DIAGNOSIS — R55 SYNCOPE, UNSPECIFIED SYNCOPE TYPE: ICD-10-CM

## 2021-12-09 LAB
ALBUMIN SERPL BCP-MCNC: 4.2 G/DL (ref 3.2–4.9)
ALBUMIN/GLOB SERPL: 1.4 G/DL
ALP SERPL-CCNC: 64 U/L (ref 30–99)
ALT SERPL-CCNC: 24 U/L (ref 2–50)
ANION GAP SERPL CALC-SCNC: 11 MMOL/L (ref 7–16)
APPEARANCE UR: CLEAR
AST SERPL-CCNC: 22 U/L (ref 12–45)
BASOPHILS # BLD AUTO: 0.3 % (ref 0–1.8)
BASOPHILS # BLD: 0.03 K/UL (ref 0–0.12)
BILIRUB SERPL-MCNC: 0.5 MG/DL (ref 0.1–1.5)
BILIRUB UR QL STRIP.AUTO: NEGATIVE
BUN SERPL-MCNC: 21 MG/DL (ref 8–22)
CALCIUM SERPL-MCNC: 9.2 MG/DL (ref 8.5–10.5)
CHLORIDE SERPL-SCNC: 103 MMOL/L (ref 96–112)
CO2 SERPL-SCNC: 25 MMOL/L (ref 20–33)
COLOR UR: YELLOW
CREAT SERPL-MCNC: 0.7 MG/DL (ref 0.5–1.4)
EKG IMPRESSION: NORMAL
EOSINOPHIL # BLD AUTO: 0.08 K/UL (ref 0–0.51)
EOSINOPHIL NFR BLD: 0.9 % (ref 0–6.9)
ERYTHROCYTE [DISTWIDTH] IN BLOOD BY AUTOMATED COUNT: 43.6 FL (ref 35.9–50)
GLOBULIN SER CALC-MCNC: 2.9 G/DL (ref 1.9–3.5)
GLUCOSE SERPL-MCNC: 148 MG/DL (ref 65–99)
GLUCOSE UR STRIP.AUTO-MCNC: NEGATIVE MG/DL
HCT VFR BLD AUTO: 43.5 % (ref 42–52)
HGB BLD-MCNC: 14.8 G/DL (ref 14–18)
IMM GRANULOCYTES # BLD AUTO: 0.04 K/UL (ref 0–0.11)
IMM GRANULOCYTES NFR BLD AUTO: 0.5 % (ref 0–0.9)
KETONES UR STRIP.AUTO-MCNC: ABNORMAL MG/DL
LEUKOCYTE ESTERASE UR QL STRIP.AUTO: NEGATIVE
LYMPHOCYTES # BLD AUTO: 1.33 K/UL (ref 1–4.8)
LYMPHOCYTES NFR BLD: 15 % (ref 22–41)
MCH RBC QN AUTO: 29.9 PG (ref 27–33)
MCHC RBC AUTO-ENTMCNC: 34 G/DL (ref 33.7–35.3)
MCV RBC AUTO: 87.9 FL (ref 81.4–97.8)
MICRO URNS: ABNORMAL
MONOCYTES # BLD AUTO: 0.52 K/UL (ref 0–0.85)
MONOCYTES NFR BLD AUTO: 5.9 % (ref 0–13.4)
NEUTROPHILS # BLD AUTO: 6.87 K/UL (ref 1.82–7.42)
NEUTROPHILS NFR BLD: 77.4 % (ref 44–72)
NITRITE UR QL STRIP.AUTO: NEGATIVE
NRBC # BLD AUTO: 0 K/UL
NRBC BLD-RTO: 0 /100 WBC
PH UR STRIP.AUTO: 6.5 [PH] (ref 5–8)
PLATELET # BLD AUTO: 212 K/UL (ref 164–446)
PMV BLD AUTO: 10.5 FL (ref 9–12.9)
POTASSIUM SERPL-SCNC: 4 MMOL/L (ref 3.6–5.5)
PROT SERPL-MCNC: 7.1 G/DL (ref 6–8.2)
PROT UR QL STRIP: NEGATIVE MG/DL
RBC # BLD AUTO: 4.95 M/UL (ref 4.7–6.1)
RBC UR QL AUTO: NEGATIVE
SODIUM SERPL-SCNC: 139 MMOL/L (ref 135–145)
SP GR UR STRIP.AUTO: 1.02
TROPONIN T SERPL-MCNC: 11 NG/L (ref 6–19)
UROBILINOGEN UR STRIP.AUTO-MCNC: 0.2 MG/DL
WBC # BLD AUTO: 8.9 K/UL (ref 4.8–10.8)

## 2021-12-09 PROCEDURE — 36415 COLL VENOUS BLD VENIPUNCTURE: CPT

## 2021-12-09 PROCEDURE — 71045 X-RAY EXAM CHEST 1 VIEW: CPT

## 2021-12-09 PROCEDURE — 84484 ASSAY OF TROPONIN QUANT: CPT

## 2021-12-09 PROCEDURE — 99285 EMERGENCY DEPT VISIT HI MDM: CPT

## 2021-12-09 PROCEDURE — 81003 URINALYSIS AUTO W/O SCOPE: CPT

## 2021-12-09 PROCEDURE — 99220 PR INITIAL OBSERVATION CARE,LEVL III: CPT | Performed by: STUDENT IN AN ORGANIZED HEALTH CARE EDUCATION/TRAINING PROGRAM

## 2021-12-09 PROCEDURE — 85025 COMPLETE CBC W/AUTO DIFF WBC: CPT

## 2021-12-09 PROCEDURE — G0378 HOSPITAL OBSERVATION PER HR: HCPCS

## 2021-12-09 PROCEDURE — 93005 ELECTROCARDIOGRAM TRACING: CPT | Performed by: EMERGENCY MEDICINE

## 2021-12-09 PROCEDURE — 80053 COMPREHEN METABOLIC PANEL: CPT

## 2021-12-09 RX ORDER — AMLODIPINE BESYLATE 10 MG/1
10 TABLET ORAL
Status: DISCONTINUED | OUTPATIENT
Start: 2021-12-10 | End: 2021-12-10 | Stop reason: HOSPADM

## 2021-12-09 RX ORDER — ONDANSETRON 2 MG/ML
4 INJECTION INTRAMUSCULAR; INTRAVENOUS EVERY 4 HOURS PRN
Status: DISCONTINUED | OUTPATIENT
Start: 2021-12-09 | End: 2021-12-10 | Stop reason: HOSPADM

## 2021-12-09 RX ORDER — HYDROCHLOROTHIAZIDE 25 MG/1
25 TABLET ORAL DAILY
Status: DISCONTINUED | OUTPATIENT
Start: 2021-12-10 | End: 2021-12-10 | Stop reason: HOSPADM

## 2021-12-09 RX ORDER — LABETALOL HYDROCHLORIDE 5 MG/ML
10 INJECTION, SOLUTION INTRAVENOUS EVERY 4 HOURS PRN
Status: DISCONTINUED | OUTPATIENT
Start: 2021-12-09 | End: 2021-12-10 | Stop reason: HOSPADM

## 2021-12-09 RX ORDER — DEXTROSE MONOHYDRATE 25 G/50ML
50 INJECTION, SOLUTION INTRAVENOUS
Status: DISCONTINUED | OUTPATIENT
Start: 2021-12-09 | End: 2021-12-10 | Stop reason: HOSPADM

## 2021-12-09 RX ORDER — SIMVASTATIN 20 MG
20 TABLET ORAL EVERY EVENING
Status: DISCONTINUED | OUTPATIENT
Start: 2021-12-10 | End: 2021-12-10 | Stop reason: HOSPADM

## 2021-12-09 RX ORDER — DOXAZOSIN 2 MG/1
4 TABLET ORAL DAILY
Status: DISCONTINUED | OUTPATIENT
Start: 2021-12-10 | End: 2021-12-10 | Stop reason: HOSPADM

## 2021-12-09 RX ORDER — BISACODYL 10 MG
10 SUPPOSITORY, RECTAL RECTAL
Status: DISCONTINUED | OUTPATIENT
Start: 2021-12-09 | End: 2021-12-10 | Stop reason: HOSPADM

## 2021-12-09 RX ORDER — ACETAMINOPHEN 325 MG/1
650 TABLET ORAL EVERY 6 HOURS PRN
Status: DISCONTINUED | OUTPATIENT
Start: 2021-12-09 | End: 2021-12-10 | Stop reason: HOSPADM

## 2021-12-09 RX ORDER — AMOXICILLIN 250 MG
2 CAPSULE ORAL 2 TIMES DAILY
Status: DISCONTINUED | OUTPATIENT
Start: 2021-12-09 | End: 2021-12-10 | Stop reason: HOSPADM

## 2021-12-09 RX ORDER — ONDANSETRON 4 MG/1
4 TABLET, ORALLY DISINTEGRATING ORAL EVERY 4 HOURS PRN
Status: DISCONTINUED | OUTPATIENT
Start: 2021-12-09 | End: 2021-12-10 | Stop reason: HOSPADM

## 2021-12-09 RX ORDER — AMLODIPINE BESYLATE 10 MG/1
10 TABLET ORAL DAILY
COMMUNITY

## 2021-12-09 RX ORDER — POLYETHYLENE GLYCOL 3350 17 G/17G
1 POWDER, FOR SOLUTION ORAL
Status: DISCONTINUED | OUTPATIENT
Start: 2021-12-09 | End: 2021-12-10 | Stop reason: HOSPADM

## 2021-12-09 RX ORDER — AMLODIPINE AND BENAZEPRIL HYDROCHLORIDE 10; 40 MG/1; MG/1
1 CAPSULE ORAL DAILY
Status: DISCONTINUED | OUTPATIENT
Start: 2021-12-10 | End: 2021-12-09

## 2021-12-09 RX ORDER — LISINOPRIL 40 MG/1
40 TABLET ORAL DAILY
COMMUNITY

## 2021-12-09 RX ORDER — GUAIFENESIN/DEXTROMETHORPHAN 100-10MG/5
10 SYRUP ORAL EVERY 6 HOURS PRN
Status: DISCONTINUED | OUTPATIENT
Start: 2021-12-09 | End: 2021-12-10 | Stop reason: HOSPADM

## 2021-12-10 ENCOUNTER — PATIENT OUTREACH (OUTPATIENT)
Dept: HEALTH INFORMATION MANAGEMENT | Facility: OTHER | Age: 86
End: 2021-12-10

## 2021-12-10 ENCOUNTER — APPOINTMENT (OUTPATIENT)
Dept: CARDIOLOGY | Facility: MEDICAL CENTER | Age: 86
End: 2021-12-10
Attending: STUDENT IN AN ORGANIZED HEALTH CARE EDUCATION/TRAINING PROGRAM
Payer: MEDICARE

## 2021-12-10 VITALS
HEART RATE: 60 BPM | HEIGHT: 71 IN | SYSTOLIC BLOOD PRESSURE: 156 MMHG | WEIGHT: 184.75 LBS | OXYGEN SATURATION: 94 % | DIASTOLIC BLOOD PRESSURE: 72 MMHG | BODY MASS INDEX: 25.86 KG/M2 | RESPIRATION RATE: 17 BRPM | TEMPERATURE: 96.9 F

## 2021-12-10 PROBLEM — R55 SYNCOPE: Status: RESOLVED | Noted: 2021-12-09 | Resolved: 2021-12-10

## 2021-12-10 PROBLEM — I95.1 ORTHOSTATIC HYPOTENSION: Status: ACTIVE | Noted: 2021-12-10

## 2021-12-10 PROBLEM — I95.1 ORTHOSTATIC HYPOTENSION: Status: RESOLVED | Noted: 2021-12-10 | Resolved: 2021-12-10

## 2021-12-10 LAB
ALBUMIN SERPL BCP-MCNC: 3.9 G/DL (ref 3.2–4.9)
ALBUMIN/GLOB SERPL: 1.5 G/DL
ALP SERPL-CCNC: 59 U/L (ref 30–99)
ALT SERPL-CCNC: 18 U/L (ref 2–50)
ANION GAP SERPL CALC-SCNC: 10 MMOL/L (ref 7–16)
AST SERPL-CCNC: 18 U/L (ref 12–45)
BASOPHILS # BLD AUTO: 0.4 % (ref 0–1.8)
BASOPHILS # BLD: 0.03 K/UL (ref 0–0.12)
BILIRUB SERPL-MCNC: 0.4 MG/DL (ref 0.1–1.5)
BUN SERPL-MCNC: 20 MG/DL (ref 8–22)
CALCIUM SERPL-MCNC: 8.8 MG/DL (ref 8.5–10.5)
CHLORIDE SERPL-SCNC: 102 MMOL/L (ref 96–112)
CHOLEST SERPL-MCNC: 128 MG/DL (ref 100–199)
CO2 SERPL-SCNC: 27 MMOL/L (ref 20–33)
CREAT SERPL-MCNC: 0.68 MG/DL (ref 0.5–1.4)
D DIMER PPP IA.FEU-MCNC: 0.79 UG/ML (FEU) (ref 0–0.5)
EOSINOPHIL # BLD AUTO: 0.16 K/UL (ref 0–0.51)
EOSINOPHIL NFR BLD: 2.2 % (ref 0–6.9)
ERYTHROCYTE [DISTWIDTH] IN BLOOD BY AUTOMATED COUNT: 43.7 FL (ref 35.9–50)
EST. AVERAGE GLUCOSE BLD GHB EST-MCNC: 137 MG/DL
GLOBULIN SER CALC-MCNC: 2.6 G/DL (ref 1.9–3.5)
GLUCOSE BLD-MCNC: 104 MG/DL (ref 65–99)
GLUCOSE BLD-MCNC: 115 MG/DL (ref 65–99)
GLUCOSE SERPL-MCNC: 156 MG/DL (ref 65–99)
HBA1C MFR BLD: 6.4 % (ref 4–5.6)
HCT VFR BLD AUTO: 40.4 % (ref 42–52)
HDLC SERPL-MCNC: 50 MG/DL
HGB BLD-MCNC: 13.9 G/DL (ref 14–18)
IMM GRANULOCYTES # BLD AUTO: 0.02 K/UL (ref 0–0.11)
IMM GRANULOCYTES NFR BLD AUTO: 0.3 % (ref 0–0.9)
LDLC SERPL CALC-MCNC: 65 MG/DL
LV EJECT FRACT  99904: 60
LV EJECT FRACT MOD 2C 99903: 59.8
LV EJECT FRACT MOD 4C 99902: 64.13
LV EJECT FRACT MOD BP 99901: 62.74
LYMPHOCYTES # BLD AUTO: 2.03 K/UL (ref 1–4.8)
LYMPHOCYTES NFR BLD: 28 % (ref 22–41)
MAGNESIUM SERPL-MCNC: 2 MG/DL (ref 1.5–2.5)
MCH RBC QN AUTO: 30.2 PG (ref 27–33)
MCHC RBC AUTO-ENTMCNC: 34.4 G/DL (ref 33.7–35.3)
MCV RBC AUTO: 87.8 FL (ref 81.4–97.8)
MONOCYTES # BLD AUTO: 0.74 K/UL (ref 0–0.85)
MONOCYTES NFR BLD AUTO: 10.2 % (ref 0–13.4)
NEUTROPHILS # BLD AUTO: 4.27 K/UL (ref 1.82–7.42)
NEUTROPHILS NFR BLD: 58.9 % (ref 44–72)
NRBC # BLD AUTO: 0 K/UL
NRBC BLD-RTO: 0 /100 WBC
NT-PROBNP SERPL IA-MCNC: 634 PG/ML (ref 0–125)
PLATELET # BLD AUTO: 212 K/UL (ref 164–446)
PMV BLD AUTO: 10.6 FL (ref 9–12.9)
POTASSIUM SERPL-SCNC: 3.4 MMOL/L (ref 3.6–5.5)
PROT SERPL-MCNC: 6.5 G/DL (ref 6–8.2)
RBC # BLD AUTO: 4.6 M/UL (ref 4.7–6.1)
SODIUM SERPL-SCNC: 139 MMOL/L (ref 135–145)
TRIGL SERPL-MCNC: 66 MG/DL (ref 0–149)
WBC # BLD AUTO: 7.3 K/UL (ref 4.8–10.8)

## 2021-12-10 PROCEDURE — 700102 HCHG RX REV CODE 250 W/ 637 OVERRIDE(OP): Performed by: STUDENT IN AN ORGANIZED HEALTH CARE EDUCATION/TRAINING PROGRAM

## 2021-12-10 PROCEDURE — 82962 GLUCOSE BLOOD TEST: CPT

## 2021-12-10 PROCEDURE — 80053 COMPREHEN METABOLIC PANEL: CPT

## 2021-12-10 PROCEDURE — G0378 HOSPITAL OBSERVATION PER HR: HCPCS

## 2021-12-10 PROCEDURE — 80061 LIPID PANEL: CPT

## 2021-12-10 PROCEDURE — 83036 HEMOGLOBIN GLYCOSYLATED A1C: CPT

## 2021-12-10 PROCEDURE — 700111 HCHG RX REV CODE 636 W/ 250 OVERRIDE (IP): Performed by: STUDENT IN AN ORGANIZED HEALTH CARE EDUCATION/TRAINING PROGRAM

## 2021-12-10 PROCEDURE — 83880 ASSAY OF NATRIURETIC PEPTIDE: CPT

## 2021-12-10 PROCEDURE — 99217 PR OBSERVATION CARE DISCHARGE: CPT | Performed by: INTERNAL MEDICINE

## 2021-12-10 PROCEDURE — 700102 HCHG RX REV CODE 250 W/ 637 OVERRIDE(OP): Performed by: EMERGENCY MEDICINE

## 2021-12-10 PROCEDURE — 93306 TTE W/DOPPLER COMPLETE: CPT | Mod: 26 | Performed by: INTERNAL MEDICINE

## 2021-12-10 PROCEDURE — A9270 NON-COVERED ITEM OR SERVICE: HCPCS | Performed by: INTERNAL MEDICINE

## 2021-12-10 PROCEDURE — A9270 NON-COVERED ITEM OR SERVICE: HCPCS | Performed by: EMERGENCY MEDICINE

## 2021-12-10 PROCEDURE — 93306 TTE W/DOPPLER COMPLETE: CPT

## 2021-12-10 PROCEDURE — 700102 HCHG RX REV CODE 250 W/ 637 OVERRIDE(OP): Performed by: INTERNAL MEDICINE

## 2021-12-10 PROCEDURE — 85025 COMPLETE CBC W/AUTO DIFF WBC: CPT

## 2021-12-10 PROCEDURE — 85379 FIBRIN DEGRADATION QUANT: CPT

## 2021-12-10 PROCEDURE — 83735 ASSAY OF MAGNESIUM: CPT

## 2021-12-10 PROCEDURE — 36415 COLL VENOUS BLD VENIPUNCTURE: CPT

## 2021-12-10 PROCEDURE — 96372 THER/PROPH/DIAG INJ SC/IM: CPT

## 2021-12-10 PROCEDURE — A9270 NON-COVERED ITEM OR SERVICE: HCPCS | Performed by: STUDENT IN AN ORGANIZED HEALTH CARE EDUCATION/TRAINING PROGRAM

## 2021-12-10 RX ORDER — ASPIRIN 325 MG
325 TABLET ORAL ONCE
Status: COMPLETED | OUTPATIENT
Start: 2021-12-10 | End: 2021-12-10

## 2021-12-10 RX ORDER — POTASSIUM CHLORIDE 20 MEQ/1
20 TABLET, EXTENDED RELEASE ORAL ONCE
Status: COMPLETED | OUTPATIENT
Start: 2021-12-10 | End: 2021-12-10

## 2021-12-10 RX ADMIN — POTASSIUM CHLORIDE 20 MEQ: 1500 TABLET, EXTENDED RELEASE ORAL at 14:15

## 2021-12-10 RX ADMIN — HYDROCHLOROTHIAZIDE 25 MG: 25 TABLET ORAL at 05:42

## 2021-12-10 RX ADMIN — ENOXAPARIN SODIUM 40 MG: 40 INJECTION SUBCUTANEOUS at 05:42

## 2021-12-10 RX ADMIN — AMLODIPINE BESYLATE 10 MG: 5 TABLET ORAL at 05:42

## 2021-12-10 RX ADMIN — ASPIRIN 81 MG: 81 TABLET, COATED ORAL at 05:42

## 2021-12-10 RX ADMIN — ASPIRIN 325 MG ORAL TABLET 325 MG: 325 PILL ORAL at 01:16

## 2021-12-10 RX ADMIN — DOXAZOSIN 4 MG: 2 TABLET ORAL at 05:42

## 2021-12-10 RX ADMIN — SENNOSIDES AND DOCUSATE SODIUM 2 TABLET: 50; 8.6 TABLET ORAL at 05:42

## 2021-12-10 ASSESSMENT — ENCOUNTER SYMPTOMS: LOSS OF CONSCIOUSNESS: 1

## 2021-12-10 ASSESSMENT — LIFESTYLE VARIABLES
ALCOHOL_USE: NO
TOTAL SCORE: 0
HOW MANY TIMES IN THE PAST YEAR HAVE YOU HAD 5 OR MORE DRINKS IN A DAY: 0
TOTAL SCORE: 0
TOTAL SCORE: 0
EVER HAD A DRINK FIRST THING IN THE MORNING TO STEADY YOUR NERVES TO GET RID OF A HANGOVER: NO
ON A TYPICAL DAY WHEN YOU DRINK ALCOHOL HOW MANY DRINKS DO YOU HAVE: 0
CONSUMPTION TOTAL: NEGATIVE
HAVE PEOPLE ANNOYED YOU BY CRITICIZING YOUR DRINKING: NO
EVER FELT BAD OR GUILTY ABOUT YOUR DRINKING: NO
AVERAGE NUMBER OF DAYS PER WEEK YOU HAVE A DRINK CONTAINING ALCOHOL: 0
DOES PATIENT WANT TO STOP DRINKING: NO
HAVE YOU EVER FELT YOU SHOULD CUT DOWN ON YOUR DRINKING: NO

## 2021-12-10 ASSESSMENT — PATIENT HEALTH QUESTIONNAIRE - PHQ9
1. LITTLE INTEREST OR PLEASURE IN DOING THINGS: NOT AT ALL
SUM OF ALL RESPONSES TO PHQ9 QUESTIONS 1 AND 2: 0
2. FEELING DOWN, DEPRESSED, IRRITABLE, OR HOPELESS: NOT AT ALL

## 2021-12-10 ASSESSMENT — PAIN DESCRIPTION - PAIN TYPE: TYPE: ACUTE PAIN

## 2021-12-10 ASSESSMENT — FIBROSIS 4 INDEX: FIB4 SCORE: 1.74

## 2021-12-10 NOTE — ED NOTES
Received report from Jeremias TRAN assumed care done. Resting in bed comfortably, respiration unlabored. AAox4

## 2021-12-10 NOTE — ED NOTES
Med Rec completed per patient and med list at bedside (Returned)  Allergies reviewed  No ORAL antibiotics in last 30 days    Patient takes Aspirin 81 mg daily. He reports that his last dose was this morning at 0900

## 2021-12-10 NOTE — ED NOTES
Rounded on patient. Patient resting in gurney. No signs of distress noted. Equal chest rise and fall. No further needs at this time. Call light within reach.

## 2021-12-10 NOTE — ASSESSMENT & PLAN NOTE
When going from sitting to standing, 20 point drop in SPB, seen in ED  Fall precautions in place  Ambulate with assistance in place

## 2021-12-10 NOTE — H&P
"Hospital Medicine History & Physical Note    Date of Service  12/10/2021    Primary Care Physician  Ernesto Barraza M.D.    Consultants      Code Status  Full Code    Chief Complaint  Chief Complaint   Patient presents with   • Syncope     pt states he had 1/4 cup of wine and then pt turned pale and diapheretic and was assisted to a chair, pt states he feels fine but he had positive ortho for ems and was brought for eval        History of Presenting Illness  Jalen Huddleston is a 87 y.o. male with a past medical hx of HLD, HTN, DM2 who presented 12/9/2021 with syncopal event that occurred today while seated at dinner.   Patient had 1/4 cup of wine during a wine tasting with his friends,  then turned pale and diaphoretic. He states he \"did not feel anything come on\" and cannot recall the incident happening. He remembered standing and going to refill his glass when he was told to \"sit down\" by a nurse. He answered all questions asked and was oriented to his surroundings. He adds that he did not completely lose consciousness and \"feels fine.\"    Notable findings include: /90, RR 23, glucose 148    Chest xray showing: Interstitial pulmonary parenchymal prominence suggest chronic underlying lung disease, component of interstitial edema and/or infiltrates not excluded.  2.  Linear densities the bilateral lung bases favors changes of atelectasis  3.  Atherosclerosis    EKG showing: RBBB, ST depression lead I, V5 and V6  No ST elevation  T waves flat throughout    I discussed the plan of care with patient.    Review of Systems  Review of Systems   Neurological: Positive for loss of consciousness.   All other systems reviewed and are negative.      Past Medical History   has a past medical history of Diabetes (9/20/2011), Hyperlipidemia (9/20/2011), and Hypertension (9/20/2011). reviewed    Surgical History  none    Family History    Family history reviewed with patient. There is no family history that is pertinent " to the chief complaint.     Social History   reports that he has never smoked. He has never used smokeless tobacco. He reports current alcohol use of about 3.5 oz of alcohol per week. He reports that he does not use drugs. reviewed.    Allergies  No Known Allergies    Medications  Prior to Admission Medications   Prescriptions Last Dose Informant Patient Reported? Taking?   amlodipine-benazepril (LOTREL) 10-40 MG per capsule   No No   Sig: Take 1 Cap by mouth every day.   aspirin EC (ECOTRIN) 81 MG TBEC   Yes No   Sig: Take 81 mg by mouth every day.   doxazosin (CARDURA) 4 MG TABS   No No   Sig: Take 1 Tab by mouth every day.   hydrochlorothiazide (HYDRODIURIL) 25 MG TABS   No No   Sig: Take 1 Tab by mouth every day.   metFORMIN (GLUCOPHAGE) 500 MG Tab   Yes No   Sig: Take 500 mg by mouth 2 times a day, with meals. With breakfast and with lunch   metoprolol SR (TOPROL XL) 100 MG TB24   No No   Sig: Take 1 Tab by mouth every day.   simvastatin (ZOCOR) 20 MG TABS   No No   Sig: Take 1 Tab by mouth every evening.      Facility-Administered Medications: None       Physical Exam  Temp:  [36.3 °C (97.4 °F)] 36.3 °C (97.4 °F)  Pulse:  [61-82] 62  Resp:  [12-23] 18  BP: (121-157)/(58-90) 136/66  SpO2:  [91 %-93 %] 93 %  Blood Pressure : 121/58   Temperature: 36.3 °C (97.4 °F)   Pulse: 82   Respiration: (!) 23   Pulse Oximetry: 91 %       Physical Exam     Constitutional: Resting comfortably in NAD   HENT: Normocephalic, no obvious evidence of acute trauma.  Eyes: No scleral icterus. Normal conjunctiva   Neck: Comfortable movement without any obvious restriction in the range of motion.  Cardiovascular: Upon ascultation I appreciate a regular heart rhythm and a normal rate with no murmurs, rubs or gallops  Thorax & Lungs: No respiratory distress. No wheezing, rales or rhonchi heard on ausculation.  there is no obvious chest wall tenderness. I appreciate normal air movement throughout.   Abdomen: The abdomen is not visibly  distended. Upon palpation, I find it to be without tenderness.  No mass appreciated.  Skin: The exposed portions of skin reveal no obvious rash or other abnormalities.  Extremities/Musculoskeletal: no lower extremity edema with no asymmetry.  Neurologic: Alert & oriented. No focal deficits observed.   Psychiatric: Normal affect appropriate for the clinical situation.    Laboratory:  Recent Labs     12/09/21 2113   WBC 8.9   RBC 4.95   HEMOGLOBIN 14.8   HEMATOCRIT 43.5   MCV 87.9   MCH 29.9   MCHC 34.0   RDW 43.6   PLATELETCT 212   MPV 10.5     Recent Labs     12/09/21 2113   SODIUM 139   POTASSIUM 4.0   CHLORIDE 103   CO2 25   GLUCOSE 148*   BUN 21   CREATININE 0.70   CALCIUM 9.2     Recent Labs     12/09/21 2113   ALTSGPT 24   ASTSGOT 22   ALKPHOSPHAT 64   TBILIRUBIN 0.5   GLUCOSE 148*         No results for input(s): NTPROBNP in the last 72 hours.      Recent Labs     12/09/21 2113   TROPONINT 11       Imaging:  DX-CHEST-PORTABLE (1 VIEW)   Final Result         1.  Interstitial pulmonary parenchymal prominence suggest chronic underlying lung disease, component of interstitial edema and/or infiltrates not excluded.   2.  Linear densities the bilateral lung bases favors changes of atelectasis   3.  Atherosclerosis      EC-ECHOCARDIOGRAM LTD W/ CONT    (Results Pending)         Assessment/Plan:  I anticipate this patient is appropriate for observation status at this time.    Syncope- (present on admission)  Assessment & Plan  - Telemetry monitoring  - Fall precautions  - Check orthostatics  - TTE      Orthostatic hypotension- (present on admission)  Assessment & Plan  When going from sitting to standing, 20 point drop in SPB, seen in ED  Fall precautions in place  Ambulate with assistance in place    Diabetes mellitus with hyperglycemia (HCC)- (present on admission)  Assessment & Plan  ISS  A1C ordered to assess  accuchecks      Hyperlipidemia- (present on admission)  Assessment & Plan  Lipid panel ordered to  assess  Continue home statin    Hypertension- (present on admission)  Assessment & Plan  Admitted with telemetry  Continue home meds  /90 in ED      VTE prophylaxis: SCDs/TEDs

## 2021-12-10 NOTE — ED NOTES
Pt resting quietly in cart. Pt denies any complaints at this time. Pt denies pain, dizziness, SOB. Pt is in no apparent distress with stable VS. Will continue to monitor pt.

## 2021-12-10 NOTE — ED PROVIDER NOTES
"ED Provider Note    Scribed for Roshni Carreon M.D. by Reena Loera. 12/9/2021  8:05 PM    Primary care provider: Ernesto Barraza M.D.  Means of arrival: [facility]ashh[facility]eb  History obtained from: Patient  History limited by: None    CHIEF COMPLAINT  Chief Complaint   Patient presents with   • Syncope     pt states he had 1/4 cup of wine and then pt turned pale and diapheretic and was assisted to a chair, pt states he feels fine but he had positive ortho for ems and was brought for eval        HPI  Jalen Huddleston is a 87 y.o. male who presents for a syncopal episode onset today. Patient had 1/4 cup of wine. He then turned pale and diaphoretic. He states he \"did not feel anything come on\" and cannot recall the incident happening. He remembered standing and going to refill his glass when he was told to \"sit down\" by a nurse.  He was told that he was pale and diaphoretic.  Upon awakening, he reports that he answered all questions asked and was oriented to his surroundings. He is not sure if he completely lost consciousness and \"feels fine\" now.  He denies chest pain, headache, abdominal pain, back pain, shortness of breath, dizziness, numbness, tingling, focal weakness, fever, chills, cough, appetite changes, nausea, leg swelling, ankle swelling, or vomiting.  He says he is been having issues with his balance for quite some time now.  He attributes it to some hearing issues he has been having over the last few years.  He does not fall, however, despite his his balance issues.  His blood pressure consistently runs in the 130s/80s. He ate regularly today and was doing his usual activities. He has not been ill recently. He has 3 doses of the COVID-19 vaccine and the flu shot this year. He regularly sees his GP and has an appointment in January for a well check. He denies previous MI. He has a history of controlled hypertension for 20 years and arthritis in the hip and hand. He is hard of hearing. He lives by himself but is " active and involved in social activities.     REVIEW OF SYSTEMS  Pertinent positives include: Syncope, Diaphoresis, Paleness  Pertinent negatives include: Chest pain, headache, abdominal pain, back pain, shortness of breath, dizziness, numbness, tingling, fever, chills, cough, appetite changes, nausea, leg swelling, ankle swelling, or vomiting.  See HPI for further details. All other systems are negative.    PAST MEDICAL HISTORY  Past Medical History:   Diagnosis Date   • Diabetes 9/20/2011   • Hyperlipidemia 9/20/2011   • Hypertension 9/20/2011       FAMILY HISTORY  Family History   Problem Relation Age of Onset   • Other Neg Hx        SOCIAL HISTORY  Social History     Socioeconomic History   • Marital status:    Occupational History   • None noted   Tobacco Use   • Smoking status: Never Smoker   • Smokeless tobacco: Never Used   Vaping Use   • Vaping Use: Never used   Substance and Sexual Activity   • Alcohol use: Yes     Alcohol/week: 3.5 oz     Types: 7 Glasses of wine per week     Comment: 0cc   • Drug use: No   • Sexual activity: None noted     SURGICAL HISTORY  History reviewed. No pertinent surgical history.    CURRENT MEDICATIONS  Home Medications     Reviewed by Ramiro Hoffmann (Pharmacy Tech) on 12/09/21 at 2334  Med List Status: Complete   Medication Last Dose Status   amLODIPine (NORVASC) 10 MG Tab 12/9/2021 Active   Apoaequorin (PREVAGEN EXTRA STRENGTH PO) 12/9/2021 Active   aspirin EC (ECOTRIN) 81 MG TBEC 12/9/2021 Active   COENZYME Q10 PO 12/9/2021 Active   doxazosin (CARDURA) 4 MG TABS 12/9/2021 Active   hydrochlorothiazide (HYDRODIURIL) 25 MG TABS 12/9/2021 Active   lisinopril (PRINIVIL) 40 MG tablet 12/9/2021 Active   metoprolol SR (TOPROL XL) 100 MG TB24 12/9/2021 Active   Multiple Vitamins-Minerals (ICAPS AREDS 2 PO) 12/9/2021 Active   simvastatin (ZOCOR) 20 MG TABS 12/8/2021 Active                ALLERGIES  No Known Allergies    PHYSICAL EXAM  VITAL SIGNS: /72   Pulse 65   " Temp 36.3 °C (97.4 °F) (Temporal)   Resp 14   Ht 1.803 m (5' 11\")   Wt 86.2 kg (190 lb)   SpO2 91%   BMI 26.50 kg/m²    Constitutional: Well developed, well nourished; No acute distress; Non-toxic appearance.   HENT: Normocephalic, atraumatic; Bilateral external ears normal; Oropharyngeal exam deferred to COVID-19 outbreak and lack of oropharyngeal complaints.  Eyes: PERRL, EOMI, Conjunctiva normal. No discharge.   Neck:  Supple, nontender midline; No stridor; No nuchal rigidity.   Lymphatic: No cervical lymphadenopathy noted.   Cardiovascular: Regular rate and rhythm without murmurs, rubs, or gallop. Distant heart sounds.   Thorax & Lungs: No respiratory distress, breath sounds clear to auscultation bilaterally without wheezing, rales or rhonchi. Nontender chest wall. No crepitus or subcutaneous air  Abdomen: Soft, nontender, bowel sounds normal. No obvious masses; No pulsatile masses; no rebound, guarding, or peritoneal signs.   Skin: Good color; warm and dry without rash or petechia.  Back: Nontender, No CVA tenderness.   Extremities: Distal radial, dorsalis pedis, posterior tibial pulses are equal bilaterally; No edema; Nontender calves or saphenous, No cyanosis, No clubbing.   Musculoskeletal: Good range of motion in all major joints. No tenderness to palpation or major deformities noted.   Neurologic: Alert & oriented x 4, clear speech.    EKG  12 Lead EKG interpreted by me as shown below.    LABS/RADIOLOGY/PROCEDURES  Results for orders placed or performed during the hospital encounter of 12/09/21   CBC WITH DIFFERENTIAL   Result Value Ref Range    WBC 8.9 4.8 - 10.8 K/uL    RBC 4.95 4.70 - 6.10 M/uL    Hemoglobin 14.8 14.0 - 18.0 g/dL    Hematocrit 43.5 42.0 - 52.0 %    MCV 87.9 81.4 - 97.8 fL    MCH 29.9 27.0 - 33.0 pg    MCHC 34.0 33.7 - 35.3 g/dL    RDW 43.6 35.9 - 50.0 fL    Platelet Count 212 164 - 446 K/uL    MPV 10.5 9.0 - 12.9 fL    Neutrophils-Polys 77.40 (H) 44.00 - 72.00 %    Lymphocytes " 15.00 (L) 22.00 - 41.00 %    Monocytes 5.90 0.00 - 13.40 %    Eosinophils 0.90 0.00 - 6.90 %    Basophils 0.30 0.00 - 1.80 %    Immature Granulocytes 0.50 0.00 - 0.90 %    Nucleated RBC 0.00 /100 WBC    Neutrophils (Absolute) 6.87 1.82 - 7.42 K/uL    Lymphs (Absolute) 1.33 1.00 - 4.80 K/uL    Monos (Absolute) 0.52 0.00 - 0.85 K/uL    Eos (Absolute) 0.08 0.00 - 0.51 K/uL    Baso (Absolute) 0.03 0.00 - 0.12 K/uL    Immature Granulocytes (abs) 0.04 0.00 - 0.11 K/uL    NRBC (Absolute) 0.00 K/uL   COMP METABOLIC PANEL   Result Value Ref Range    Sodium 139 135 - 145 mmol/L    Potassium 4.0 3.6 - 5.5 mmol/L    Chloride 103 96 - 112 mmol/L    Co2 25 20 - 33 mmol/L    Anion Gap 11.0 7.0 - 16.0    Glucose 148 (H) 65 - 99 mg/dL    Bun 21 8 - 22 mg/dL    Creatinine 0.70 0.50 - 1.40 mg/dL    Calcium 9.2 8.5 - 10.5 mg/dL    AST(SGOT) 22 12 - 45 U/L    ALT(SGPT) 24 2 - 50 U/L    Alkaline Phosphatase 64 30 - 99 U/L    Total Bilirubin 0.5 0.1 - 1.5 mg/dL    Albumin 4.2 3.2 - 4.9 g/dL    Total Protein 7.1 6.0 - 8.2 g/dL    Globulin 2.9 1.9 - 3.5 g/dL    A-G Ratio 1.4 g/dL   TROPONIN   Result Value Ref Range    Troponin T 11 6 - 19 ng/L   URINALYSIS (UA)    Specimen: Urine, Clean Catch   Result Value Ref Range    Color Yellow     Character Clear     Specific Gravity 1.016 <1.035    Ph 6.5 5.0 - 8.0    Glucose Negative Negative mg/dL    Ketones Trace (A) Negative mg/dL    Protein Negative Negative mg/dL    Bilirubin Negative Negative    Urobilinogen, Urine 0.2 Negative    Nitrite Negative Negative    Leukocyte Esterase Negative Negative    Occult Blood Negative Negative    Micro Urine Req see below    ESTIMATED GFR   Result Value Ref Range    GFR If African American >60 >60 mL/min/1.73 m 2    GFR If Non African American >60 >60 mL/min/1.73 m 2   EKG   Result Value Ref Range    Report       [facility] Emergency Dept.    Test Date:  2021-12-09  Pt Name:    KD ALEJO             Department: ER  MRN:         [id number]                      Room:       [facility]  Gender:     Male                         Technician: TCM  :        [id number]                   Requested By:ER TRIAGE PROTOCOL  Order #:    [id number]                    Reading MD: Roshni Carreon    Measurements  Intervals                                Axis  Rate:       68                           P:          76  TN:         172                          QRS:        30  QRSD:       140                          T:          -32  QT:         416  QTc:        443    Interpretive Statements  SINUS RHYTHM rate of 68  ST depression lead I, V5 and V6  No ST elevation  T waves flat throughout  RIGHT BUNDLE BRANCH BLOCK  Compared to ECG 2019 17:27:51  No significant changes  Electronically Signed On 2021 22:34:23 PST by Roshni Carreon        DX-CHEST-PORTABLE (1 VIEW)   Final Result         1.  Interstitial pulmonary parenchymal prominence suggest chronic underlying lung disease, component of interstitial edema and/or infiltrates not excluded.   2.  Linear densities the bilateral lung bases favors changes of atelectasis   3.  Atherosclerosis      EC-ECHOCARDIOGRAM LTD W/ CONT    (Results Pending)       COURSE & MEDICAL DECISION MAKING  Pertinent Labs & Imaging studies reviewed. (See chart for details)        8:05 PM - Patient seen and examined at bedside. Discussed plan of care including checking electrolytes. It does not sound like the patient is in pain. Patient agrees to the plan of care. Ordered for imaging, labs, and EKG to evaluate his symptoms.       2310 - Paged Hospitalist.    2315 - I discussed the patient's case and the above findings with Dr. Mendez (Hospitalist) who agrees to evaluate the patient for hospitalization.       Patient presents to the ER after a syncopal episode when she turned pale and diaphoretic per witnesses.  The patient was assisted to a chair.  He says he feels fine now.  He was orthostatic for EMS.  He is not orthostatic here.  Patient  said he did not have any warning that he was going to pass out.  He denies having headache, chest pain, abdominal pain and back pain.  He denies shortness of breath and cough.  He has not felt sick or ill lately.  EKG reveals some minimal new ST depression in lead I, V2 and V3.  This is new from 2019.  No ST elevation.  Initial troponin was 11.  At this time no evidence for STEMI or non-STEMI.  Patient's blood pressures been running in the 130s to 140 systolic.  He has not been hypotensive here.  Urine is clear.  No evidence of UTI.  Electrolytes are normal.  Chest x-ray is unremarkable.  He had no complaint of headache and his orientation status is completely normal.  He had no complaints of numbness, tingling or weakness of extremities.  He denies having any strokelike symptoms.  At this time and I think he needs CT of the brain.  At this time I did have a good explanation for patient's syncopal episode.  However, I think he warrants overnight hospitalization and further monitoring especially since his EKG has some subtle ischemic changes when compared to previous.  I spoke with Dr. Mendez, hospitalist on-call, and she will kindly evaluate the patient hospitalization.    DISPOSITION:  Patient will be hospitalized by Dr. Mendez in guarded condition.     FINAL IMPRESSION  1. Syncope, unspecified syncope type Acute   2. Abnormal EKG Acute        This dictation has been created using voice recognition software. The accuracy of the dictation is limited by the abilities of the software. I expect there may be some errors of grammar and possibly content. I made every attempt to manually correct the errors within my dictation. However, errors related to voice recognition software may still exist and should be interpreted within the appropriate context.     Reena BARRAZA (Gautam), am scribing for, and in the presence of, Roshni Carreon M.D..    Electronically signed by: Reena Hoyt), 12/9/2021    Roshni BARRAZA  MARIELLE Carreon personally performed the services described in this documentation, as scribed by Reena Loera in my presence, and it is both accurate and complete.    The note accurately reflects work and decisions made by me.  Roshni Carreon M.D.  12/10/2021  12:17 AM

## 2021-12-10 NOTE — ED TRIAGE NOTES
"Chief Complaint   Patient presents with   • Syncope     pt states he had 1/4 cup of wine and then pt turned pale and diapheretic and was assisted to a chair, pt states he feels fine but he had positive ortho for ems and was brought for eval        /72   Pulse 65   Temp 36.3 °C (97.4 °F) (Temporal)   Resp 14   Ht 1.803 m (5' 11\")   Wt 86.2 kg (190 lb)   SpO2 91%   BMI 26.50 kg/m²     Pt biba after having a near syncope episode, pt was drinking some wine had only had a 1/4 cup of first wine and started to look pale and diaphoretic per bystanders, pt was assisted to chair, after ems arrived pt wanted to leave ama but had positive ortho for ems while sitting bp was 106/66 when pt went to stand up bp dropped to 94/56, while on gurney bp went up to 150/82, lungs clear, history of HTN, enlarged prostate, afib, RBBB, pt takes lisinopril, doxysosyn, amlodipine, Hctz, metoprolol, pts blood sugar was 156  "

## 2021-12-10 NOTE — DISCHARGE SUMMARY
Discharge Summary    CHIEF COMPLAINT ON ADMISSION  Chief Complaint   Patient presents with   • Syncope     pt states he had 1/4 cup of wine and then pt turned pale and diapheretic and was assisted to a chair, pt states he feels fine but he had positive ortho for ems and was brought for eval        Reason for Admission  Near syncope     Admission Date  12/9/2021    CODE STATUS  Full Code    HPI & HOSPITAL COURSE  Mr. Jalen Huddleston is a 87 y.o. male who presented on 12/9/2021 with syncope after drinking wine during dinner.  Chest x-ray showed chronic interstitial lung disease.  He was found to be orthostatics positive and was bolused IV fluids.  Echocardiogram showed ejection fraction 60%, grade 2 diastolic dysfunction, moderate tricuspid regurgitation, right ventricular systolic pressure 57 mmHg.  No arrhythmias found on telemetry that would cause syncope.  All the symptoms resolved and blood pressure normalized.  Patient medically stable to discharge home.  Follow-up with primary care and cardiologist as outpatient.    Therefore, he is discharged in fair and stable condition to home with close outpatient follow-up.    The patient recovered much more quickly than anticipated on admission.    Discharge Date  12/10/2021    FOLLOW UP ITEMS POST DISCHARGE  None    DISCHARGE DIAGNOSES  Principal Problem:    Syncope and collapse POA: Yes  Active Problems:    Hypertension POA: Yes    Hyperlipidemia POA: Yes    Diabetes mellitus with hyperglycemia (HCC) POA: Yes  Resolved Problems:    Syncope POA: Yes    Orthostatic hypotension POA: Yes      FOLLOW UP  Ernesto Barraza M.D.  [address]  [place] [address]  [phone]    In 2 weeks  As needed, If symptoms worsen      MEDICATIONS ON DISCHARGE     Medication List      CONTINUE taking these medications      Instructions   amLODIPine 10 MG Tabs  Commonly known as: NORVASC   Take 10 mg by mouth every day.  Dose: 10 mg     aspirin EC 81 MG Tbec  Commonly known as: ECOTRIN    Take 81 mg by mouth every day.  Dose: 81 mg     COENZYME Q10 PO   Take 1 Tablet by mouth every day.  Dose: 1 Tablet     doxazosin 4 MG Tabs  Commonly known as: CARDURA   Take 1 Tab by mouth every day.  Dose: 4 mg     hydroCHLOROthiazide 25 MG Tabs  Commonly known as: HYDRODIURIL   Take 1 Tab by mouth every day.  Dose: 25 mg     ICAPS AREDS 2 PO   Take 1 Tablet by mouth 2 times a day.  Dose: 1 Tablet     lisinopril 40 MG tablet  Commonly known as: PRINIVIL   Take 40 mg by mouth every day.  Dose: 40 mg     metoprolol  MG Tb24  Commonly known as: TOPROL XL   Take 1 Tab by mouth every day.  Dose: 100 mg     PREVAGEN EXTRA STRENGTH PO   Take 1 Tablet by mouth every day.  Dose: 1 Tablet     simvastatin 20 MG Tabs  Commonly known as: ZOCOR   Take 1 Tab by mouth every evening.  Dose: 20 mg            Allergies  No Known Allergies    DIET  Orders Placed This Encounter   Procedures   • Diet Order Diet: Consistent CHO (Diabetic)     Standing Status:   Standing     Number of Occurrences:   1     Order Specific Question:   Diet:     Answer:   Consistent CHO (Diabetic) [4]       ACTIVITY  As tolerated.  Weight bearing as tolerated    CONSULTATIONS  None    PROCEDURES  None    LABORATORY  Lab Results   Component Value Date    SODIUM 139 12/10/2021    POTASSIUM 3.4 (L) 12/10/2021    CHLORIDE 102 12/10/2021    CO2 27 12/10/2021    GLUCOSE 156 (H) 12/10/2021    BUN 20 12/10/2021    CREATININE 0.68 12/10/2021        Lab Results   Component Value Date    WBC 7.3 12/10/2021    HEMOGLOBIN 13.9 (L) 12/10/2021    HEMATOCRIT 40.4 (L) 12/10/2021    PLATELETCT 212 12/10/2021        I discussed medications and side effects with the patient.  I discussed prognosis and importance of medical compliance with the patient.  I counseled the patient about diet, exercise, weight loss, smoking cessation, and life style modifications.  All questions and concerns have been addressed.  Total time of the discharge process was 37 minutes.

## 2021-12-10 NOTE — ED NOTES
Rounded on patient. Patient resting in bed. No signs of distress noted. Equal chest rise and fall. No further needs at this time. Call light within reach. Will continue to monitor pt.   no

## 2021-12-11 NOTE — DISCHARGE INSTRUCTIONS
Discharge Instructions per Dr. Lino Hilario D.O.    DIET: Diet Order Diet: Consistent CHO (Diabetic)    ACTIVITY: As tolerated    A proper diet that is low in grease, fat, and salt, along with 30 minutes of exercise per day will lead to weight loss, and better controlled blood sugar and blood pressure.    DIAGNOSIS: Syncope and collapse    Follow up with your Primary Care Provider Ernesto Barraza M.D. as scheduled or sooner if your symptoms persist or worsen.  Return to Emergency Room for sever chest pain, shortness of breath, signs of a stroke, or any other emergencies.          Syncope  Syncope is when you pass out (faint) for a short time. It is caused by a sudden decrease in blood flow to the brain. Signs that you may be about to pass out include:  · Feeling dizzy or light-headed.  · Feeling sick to your stomach (nauseous).  · Seeing all white or all black.  · Having cold, clammy skin.  If you pass out, get help right away. Call your local emergency services (911 in the [place] Do not drive yourself to the hospital.  Follow these instructions at home:  Watch for any changes in your symptoms. Take these actions to stay safe and help with your symptoms:  Lifestyle  · Do not drive, use machinery, or play sports until your doctor says it is okay.  · Do not drink alcohol.  · Do not use any products that contain nicotine or tobacco, such as cigarettes and e-cigarettes. If you need help quitting, ask your doctor.  · Drink enough fluid to keep your pee (urine) pale yellow.  General instructions  · Take over-the-counter and prescription medicines only as told by your doctor.  · If you are taking blood pressure or heart medicine, sit up and stand up slowly. Spend a few minutes getting ready to sit and then stand. This can help you feel less dizzy.  · Have someone stay with you until you feel stable.  · If you start to feel like you might pass out, lie down right away and raise (elevate) your feet above the level  of your heart. Breathe deeply and steadily. Wait until all of the symptoms are gone.  · Keep all follow-up visits as told by your doctor. This is important.  Get help right away if:  · You have a very bad headache.  · You pass out once or more than once.  · You have pain in your chest, belly, or back.  · You have a very fast or uneven heartbeat (palpitations).  · It hurts to breathe.  · You are bleeding from your mouth or your bottom (rectum).  · You have black or tarry poop (stool).  · You have jerky movements that you cannot control (seizure).  · You are confused.  · You have trouble walking.  · You are very weak.  · You have vision problems.  These symptoms may be an emergency. Do not wait to see if the symptoms will go away. Get medical help right away. Call your local emergency services (911 in the [place] Do not drive yourself to the hospital.  Summary  · Syncope is when you pass out (faint) for a short time. It is caused by a sudden decrease in blood flow to the brain.  · Signs that you may be about to faint include feeling dizzy, light-headed, or sick to your stomach, seeing all white or all black, or having cold, clammy skin.  · If you start to feel like you might pass out, lie down right away and raise (elevate) your feet above the level of your heart. Breathe deeply and steadily. Wait until all of the symptoms are gone.  This information is not intended to replace advice given to you by your health care provider. Make sure you discuss any questions you have with your health care provider.  Document Released: 06/05/2009 Document Revised: 01/30/2019 Document Reviewed: 01/30/2019  [employer] Patient Education © 2020 [employer]    Discharge Instructions    Discharged to home by car with relative. Discharged via wheelchair, hospital escort: Yes.  Special equipment needed: Not Applicable    Be sure to schedule a follow-up appointment with your primary care doctor or any specialists as instructed.     Discharge  Plan:   Diet Plan: Discussed  Activity Level: Discussed  Confirmed Follow up Appointment: Patient to Call and Schedule Appointment  Confirmed Symptoms Management: Discussed  Medication Reconciliation Updated: Yes  Influenza Vaccine Indication: Not indicated: Previously immunized this influenza season and > 8 years of age    I understand that a diet low in cholesterol, fat, and sodium is recommended for good health. Unless I have been given specific instructions below for another diet, I accept this instruction as my diet prescription.   Other diet: heart healthy     Special Instructions: None    · Is patient discharged on Warfarin / Coumadin?   No     Depression / Suicide Risk    As you are discharged from this [facility] facility, it is important to learn how to keep safe from harming yourself.    Recognize the warning signs:  · Abrupt changes in personality, positive or negative- including increase in energy   · Giving away possessions  · Change in eating patterns- significant weight changes-  positive or negative  · Change in sleeping patterns- unable to sleep or sleeping all the time   · Unwillingness or inability to communicate  · Depression  · Unusual sadness, discouragement and loneliness  · Talk of wanting to die  · Neglect of personal appearance   · Rebelliousness- reckless behavior  · Withdrawal from people/activities they love  · Confusion- inability to concentrate     If you or a loved one observes any of these behaviors or has concerns about self-harm, here's what you can do:  · Talk about it- your feelings and reasons for harming yourself  · Remove any means that you might use to hurt yourself (examples: pills, rope, extension cords, firearm)  · Get professional help from the community (Mental Health, Substance Abuse, psychological counseling)  · Do not be alone:Call your Safe Contact- someone whom you trust who will be there for you.  · Call your local CRISIS HOTLINE 316-5480 or  685-484-1711  · Call your local Children's Mobile Crisis Response Team [place] (818) 129-8986 or www.[employer].tibdit  · Call the toll free National Suicide Prevention Hotlines   · National Suicide Prevention Lifeline 480-475-VAMG (0039)  · National [employer] Network 800-SUICIDE (600-5188)

## 2024-02-28 ENCOUNTER — APPOINTMENT (OUTPATIENT)
Dept: RADIOLOGY | Facility: MEDICAL CENTER | Age: 89
End: 2024-02-28
Attending: EMERGENCY MEDICINE
Payer: MEDICARE

## 2024-02-28 ENCOUNTER — HOSPITAL ENCOUNTER (EMERGENCY)
Facility: MEDICAL CENTER | Age: 89
End: 2024-02-28
Attending: EMERGENCY MEDICINE
Payer: MEDICARE

## 2024-02-28 VITALS
HEART RATE: 58 BPM | DIASTOLIC BLOOD PRESSURE: 83 MMHG | HEIGHT: 71 IN | TEMPERATURE: 97.2 F | RESPIRATION RATE: 16 BRPM | OXYGEN SATURATION: 93 % | SYSTOLIC BLOOD PRESSURE: 147 MMHG | BODY MASS INDEX: 26.2 KG/M2 | WEIGHT: 187.17 LBS

## 2024-02-28 DIAGNOSIS — S09.90XA CLOSED HEAD INJURY, INITIAL ENCOUNTER: ICD-10-CM

## 2024-02-28 DIAGNOSIS — S01.81XA FACIAL LACERATION, INITIAL ENCOUNTER: ICD-10-CM

## 2024-02-28 PROCEDURE — 70450 CT HEAD/BRAIN W/O DYE: CPT

## 2024-02-28 PROCEDURE — 304999 HCHG REPAIR-SIMPLE/INTERMED LEVEL 1

## 2024-02-28 PROCEDURE — 700101 HCHG RX REV CODE 250: Performed by: EMERGENCY MEDICINE

## 2024-02-28 PROCEDURE — 99284 EMERGENCY DEPT VISIT MOD MDM: CPT

## 2024-02-28 PROCEDURE — 303747 HCHG EXTRA SUTURE

## 2024-02-28 PROCEDURE — 304217 HCHG IRRIGATION SYSTEM

## 2024-02-28 RX ORDER — FINASTERIDE 5 MG/1
5 TABLET, FILM COATED ORAL DAILY
COMMUNITY

## 2024-02-28 RX ORDER — LIDOCAINE HYDROCHLORIDE AND EPINEPHRINE 10; 10 MG/ML; UG/ML
10 INJECTION, SOLUTION INFILTRATION; PERINEURAL ONCE
Status: COMPLETED | OUTPATIENT
Start: 2024-02-28 | End: 2024-02-28

## 2024-02-28 RX ORDER — METOPROLOL SUCCINATE 50 MG/1
50 TABLET, EXTENDED RELEASE ORAL DAILY
COMMUNITY

## 2024-02-28 RX ADMIN — LIDOCAINE HYDROCHLORIDE AND EPINEPHRINE 10 ML: 10; 10 INJECTION, SOLUTION INFILTRATION; PERINEURAL at 16:15

## 2024-02-28 NOTE — ED TRIAGE NOTES
"Chief Complaint   Patient presents with    Head Injury     Report he was \"taking a head of lettuce out of my trunk and it started rolling down the drive way, I went chasing after it and I fell because I stepped on it instead of the concrete\". Presents with laceration and hematoma to R eye. Denies LOC. States he is unsure if he is on blood thinners.     Physical Exam  Pulmonary:      Effort: Pulmonary effort is normal.   Skin:     General: Skin is warm and dry.   Neurological:      Mental Status: He is alert.         "

## 2024-02-28 NOTE — ED PROVIDER NOTES
"ED Provider Note    CHIEF COMPLAINT  Chief Complaint   Patient presents with    Head Injury     Report he was \"taking a head of lettuce out of my trunk and it started rolling down the drive way, I went chasing after it and I fell because I stepped on it instead of the concrete\". Presents with laceration and hematoma to R eye. Denies LOC. States he is unsure if he is on blood thinners.       EXTERNAL RECORDS REVIEWED  Inpatient Notes discharge summary reviewed from 2021.  He had an episode of syncope.  He had a echo that showed normal ejection fraction no arrhythmias.    HPI/ROS  LIMITATION TO HISTORY   Select: : None  OUTSIDE HISTORIAN(S):  none    Jalen Huddleston is a 89 y.o. male who presents for evaluation of head injury.  Patient says he was unloading his car and the head of lettuce started rolling down the driveway he went chasing after it and slipped.  He fell hitting his right eyebrow.  He denies any loss of consciousness does not take blood thinners.  He has no vision changes.    PAST MEDICAL HISTORY   has a past medical history of Diabetes (9/20/2011), Hyperlipidemia (9/20/2011), and Hypertension (9/20/2011).    SURGICAL HISTORY  patient denies any surgical history    FAMILY HISTORY  Family History   Problem Relation Age of Onset    Other Neg Hx        SOCIAL HISTORY  Social History     Tobacco Use    Smoking status: Never    Smokeless tobacco: Never   Vaping Use    Vaping Use: Never used   Substance and Sexual Activity    Alcohol use: Yes     Alcohol/week: 3.5 oz     Types: 7 Glasses of wine per week     Comment: 0cc    Drug use: No    Sexual activity: Not on file       CURRENT MEDICATIONS  Home Medications       Reviewed by Dilcia Arzola R.N. (Registered Nurse) on 02/28/24 at 1506  Med List Status: Complete     Medication Last Dose Status   amLODIPine (NORVASC) 10 MG Tab 2/28/2024 Active   Apoaequorin (PREVAGEN EXTRA STRENGTH PO) 2/28/2024 Active   aspirin EC (ECOTRIN) 81 MG TBEC 2/28/2024 Active " "  COENZYME Q10 PO 2/28/2024 Active   doxazosin (CARDURA) 4 MG TABS 2/28/2024 Active   finasteride (PROSCAR) 5 MG Tab 2/28/2024 Active   hydrochlorothiazide (HYDRODIURIL) 25 MG TABS 2/28/2024 Active   lisinopril (PRINIVIL) 40 MG tablet 2/28/2024 Active   metoprolol SR (TOPROL XL) 50 MG TABLET SR 24 HR 2/28/2024 Active   Multiple Vitamins-Minerals (ICAPS AREDS 2 PO) 2/27/2024 Active   simvastatin (ZOCOR) 20 MG TABS 2/27/2024 Active                    ALLERGIES  No Known Allergies    PHYSICAL EXAM  VITAL SIGNS: BP (!) 147/83   Pulse (!) 58   Temp 36.2 °C (97.2 °F) (Temporal)   Resp 16   Ht 1.803 m (5' 11\")   Wt 84.9 kg (187 lb 2.7 oz)   SpO2 93%   BMI 26.11 kg/m²    Constitutional: Alert in no apparent distress.  HENT: Normocephalic, 3 cm laceration to the right lateral eyebrow, Bilateral external ears normal. Nose normal.   Eyes:  Conjunctiva normal, non-icteric.   Heart: Regular rate and rhythm, no murmurs.   Lungs: Non-labored respirations  Skin: Warm, Dry, No erythema, No rash.   Neurologic: Alert, Grossly non-focal.   Psychiatric: Affect normal, Judgment normal, Mood normal, Appears appropriate and not intoxicated.   Extremities: Intact distal pulses, No edema, No tenderness.       DIAGNOSTIC STUDIES / PROCEDURES      RADIOLOGY  I have independently interpreted the diagnostic imaging associated with this visit and am waiting the final reading from the radiologist.   My preliminary interpretation is as follows: Normal-appearing head CT  Radiologist interpretation:   CT-HEAD W/O   Final Result      1.  No evidence of acute intracranial process.      2.  Cerebral atrophy as well as periventricular chronic small vessel ischemic change.             Laceration Repair Procedure Note    Indication: Laceration    Procedure: The patient was placed in the appropriate position and anesthesia around the laceration was obtained by infiltration using 1% Lidocaine with epinephrine. The wound was minimally contaminated .The " area was then irrigated with normal saline. The laceration was closed with 5-0 fast-absorbing gut. There were no additional lacerations requiring repair. The wound area was then dressed with a bandage.      Total repaired wound length: 4 cm.     Other Items: Suture count: 4    The patient tolerated the procedure well.    Complications: None      COURSE & MEDICAL DECISION MAKING    ED Observation Status? No; Patient does not meet criteria for ED Observation.     INITIAL ASSESSMENT, COURSE AND PLAN  Care Narrative: This is a 89-year-old gentleman who presents for evaluation of a head injury.  He is not on thinners he is alert oriented has no vision changes.  Head CT was performed and is negative for intracranial bleed.  He had no C-spine tenderness concerning for C-spine injury and therefore CT was not performed.  Laceration was repaired as indicated above he tolerated this well.  He will be discharged.      DISPOSITION AND DISCUSSIONS  I have discussed management of the patient with the following physicians and GRECIA's:  none    Discussion of management with other QHP or appropriate source(s): None     Escalation of care considered, and ultimately not performed:diagnostic imaging    Barriers to care at this time, including but not limited to:  none .     Decision tools and prescription drugs considered including, but not limited to: NEXUS criteria  .     The patient will return for new or worsening symptoms and is stable at the time of discharge. Patient was given return precautions. Patient and/or family member verbalizes understanding and will comply.    DISPOSITION:  Patient will be discharged home in stable condition.    FOLLOW UP:  Ernesto Barraza M.D.  [address]  [place] [address]  [phone]      As needed    [facility], Emergency Dept  [address]  [place] [id number] [phone]    Return for increasing redness swelling pain or other concerns      OUTPATIENT  MEDICATIONS:  Discharge Medication List as of 2/28/2024  4:59 PM            FINAL DIAGNOSIS  1. Closed head injury, initial encounter    2. Facial laceration, initial encounter           Electronically signed by: Sole Wolf M.D., 2/28/2024 3:28 PM

## 2024-02-29 NOTE — ED NOTES
Discharge home with instructions and follow up care reviewed and given to patient with verbal understanding.  Assist patient out to the lobby slow wide base steady gait.    Neighbor friend to drive him home.